# Patient Record
Sex: MALE | Race: WHITE | NOT HISPANIC OR LATINO | Employment: FULL TIME | URBAN - METROPOLITAN AREA
[De-identification: names, ages, dates, MRNs, and addresses within clinical notes are randomized per-mention and may not be internally consistent; named-entity substitution may affect disease eponyms.]

---

## 2019-06-17 ENCOUNTER — APPOINTMENT (OUTPATIENT)
Dept: RADIOLOGY | Facility: CLINIC | Age: 49
End: 2019-06-17

## 2019-06-17 ENCOUNTER — OFFICE VISIT (OUTPATIENT)
Dept: URGENT CARE | Facility: CLINIC | Age: 49
End: 2019-06-17

## 2019-06-17 VITALS
WEIGHT: 207 LBS | OXYGEN SATURATION: 100 % | SYSTOLIC BLOOD PRESSURE: 118 MMHG | HEIGHT: 68 IN | HEART RATE: 56 BPM | RESPIRATION RATE: 16 BRPM | TEMPERATURE: 97.6 F | DIASTOLIC BLOOD PRESSURE: 70 MMHG | BODY MASS INDEX: 31.37 KG/M2

## 2019-06-17 DIAGNOSIS — S62.647A CLOSED NONDISPLACED FRACTURE OF PROXIMAL PHALANX OF LEFT LITTLE FINGER, INITIAL ENCOUNTER: Primary | ICD-10-CM

## 2019-06-17 DIAGNOSIS — T14.90XA INJURY: ICD-10-CM

## 2019-06-17 PROCEDURE — 73140 X-RAY EXAM OF FINGER(S): CPT

## 2019-06-17 PROCEDURE — 99204 OFFICE O/P NEW MOD 45 MIN: CPT | Performed by: PHYSICIAN ASSISTANT

## 2019-06-28 ENCOUNTER — OFFICE VISIT (OUTPATIENT)
Dept: OBGYN CLINIC | Facility: CLINIC | Age: 49
End: 2019-06-28
Payer: COMMERCIAL

## 2019-06-28 ENCOUNTER — APPOINTMENT (OUTPATIENT)
Dept: RADIOLOGY | Facility: CLINIC | Age: 49
End: 2019-06-28
Payer: COMMERCIAL

## 2019-06-28 VITALS
SYSTOLIC BLOOD PRESSURE: 146 MMHG | WEIGHT: 207 LBS | HEART RATE: 69 BPM | BODY MASS INDEX: 31.37 KG/M2 | DIASTOLIC BLOOD PRESSURE: 93 MMHG | HEIGHT: 68 IN

## 2019-06-28 DIAGNOSIS — S62.647A CLOSED NONDISPLACED FRACTURE OF PROXIMAL PHALANX OF LEFT LITTLE FINGER, INITIAL ENCOUNTER: Primary | ICD-10-CM

## 2019-06-28 DIAGNOSIS — S62.647A CLOSED NONDISPLACED FRACTURE OF PROXIMAL PHALANX OF LEFT LITTLE FINGER, INITIAL ENCOUNTER: ICD-10-CM

## 2019-06-28 PROCEDURE — 73140 X-RAY EXAM OF FINGER(S): CPT

## 2019-06-28 PROCEDURE — 99203 OFFICE O/P NEW LOW 30 MIN: CPT | Performed by: ORTHOPAEDIC SURGERY

## 2019-06-28 PROCEDURE — 26720 TREAT FINGER FRACTURE EACH: CPT | Performed by: ORTHOPAEDIC SURGERY

## 2019-07-25 ENCOUNTER — OFFICE VISIT (OUTPATIENT)
Dept: OBGYN CLINIC | Facility: CLINIC | Age: 49
End: 2019-07-25

## 2019-07-25 ENCOUNTER — APPOINTMENT (OUTPATIENT)
Dept: RADIOLOGY | Facility: CLINIC | Age: 49
End: 2019-07-25
Payer: COMMERCIAL

## 2019-07-25 VITALS
WEIGHT: 206 LBS | DIASTOLIC BLOOD PRESSURE: 97 MMHG | HEIGHT: 68 IN | HEART RATE: 72 BPM | SYSTOLIC BLOOD PRESSURE: 153 MMHG | BODY MASS INDEX: 31.22 KG/M2

## 2019-07-25 DIAGNOSIS — S62.647A CLOSED NONDISPLACED FRACTURE OF PROXIMAL PHALANX OF LEFT LITTLE FINGER, INITIAL ENCOUNTER: Primary | ICD-10-CM

## 2019-07-25 DIAGNOSIS — S62.647A CLOSED NONDISPLACED FRACTURE OF PROXIMAL PHALANX OF LEFT LITTLE FINGER, INITIAL ENCOUNTER: ICD-10-CM

## 2019-07-25 PROCEDURE — 73140 X-RAY EXAM OF FINGER(S): CPT

## 2019-07-25 PROCEDURE — 99024 POSTOP FOLLOW-UP VISIT: CPT | Performed by: ORTHOPAEDIC SURGERY

## 2019-07-25 NOTE — PROGRESS NOTES
Assessment/Plan:  1  Closed nondisplaced fracture of proximal phalanx of left little finger, initial encounter  XR finger left fifth digit-adry Queen has no pain along his proximal fifth digit but does have delayed healing on his x-ray with a persistent fracture line 6 weeks after his injury  I advised continued gentle range of motion and gradual return to activity as tolerated  We could involve formal physical therapy however he does not seem to have much discomfort  We could repeat his x-ray in 1 month for further evaluation  Subjective:   Estela Yu is a 50 y o  male who presents for follow-up for a oblique nondisplaced fracture in the proximal phalanx of the fifth digit in the left hand  He denies any pain today but does report some increased swelling in the finger  He denies any new injury  He has been playing basketball with jeremias taping  Review of Systems   Constitutional: Negative for chills, fever and unexpected weight change  HENT: Negative for hearing loss, nosebleeds and sore throat  Eyes: Negative for pain, redness and visual disturbance  Respiratory: Negative for cough, shortness of breath and wheezing  Cardiovascular: Negative for chest pain, palpitations and leg swelling  Gastrointestinal: Negative for abdominal pain, nausea and vomiting  Endocrine: Negative for polyphagia and polyuria  Genitourinary: Negative for dysuria and hematuria  Musculoskeletal:        See HPI   Skin: Negative for rash and wound  Neurological: Negative for dizziness, numbness and headaches  Psychiatric/Behavioral: Negative for decreased concentration and suicidal ideas  The patient is not nervous/anxious            Past Medical History:   Diagnosis Date    Patient denies medical problems        Past Surgical History:   Procedure Laterality Date    ACHILLES TENDON REPAIR      FINGER SURGERY      right hand figth digit    KNEE SURGERY         Family History   Problem Relation Age of Onset    Fibromyalgia Mother     Aneurysm Mother     Hypertension Father     Hyperlipidemia Father        Social History     Occupational History    Not on file   Tobacco Use    Smoking status: Never Smoker    Smokeless tobacco: Never Used   Substance and Sexual Activity    Alcohol use: Yes     Frequency: 2-4 times a month     Drinks per session: 1 or 2     Binge frequency: Less than monthly    Drug use: Never    Sexual activity: Not on file       No current outpatient medications on file  Allergies   Allergen Reactions    Tincture Of Benzoin [Benzoin]        Objective:  Vitals:    07/25/19 1406   BP: 153/97   Pulse: 72       Left Hand Exam     Tenderness   The patient is experiencing no tenderness  Range of Motion   Hand   MP Little: normal   PIP Little: 80   DIP Little: 80     Other   Erythema: absent  Sensation: normal  Pulse: present            Physical Exam   Constitutional: He is oriented to person, place, and time  He appears well-developed and well-nourished  HENT:   Head: Normocephalic and atraumatic  Eyes: Pupils are equal, round, and reactive to light  Conjunctivae are normal    Neck: Normal range of motion  Neck supple  Cardiovascular: Normal rate and intact distal pulses  Pulmonary/Chest: Effort normal  No respiratory distress  Musculoskeletal:   As noted in HPI   Neurological: He is alert and oriented to person, place, and time  Skin: Skin is warm and dry  Psychiatric: He has a normal mood and affect  His behavior is normal    Vitals reviewed  I have personally reviewed pertinent films in PACS and my interpretation is as follows: Three view x-rays of the left proximal fifth digit demonstrates stable healing oblique proximal phalanx fracture with stable alignment  Visible fracture line remains evident

## 2023-07-07 ENCOUNTER — APPOINTMENT (OUTPATIENT)
Dept: LAB | Facility: CLINIC | Age: 53
End: 2023-07-07
Payer: COMMERCIAL

## 2023-07-07 ENCOUNTER — OFFICE VISIT (OUTPATIENT)
Dept: INTERNAL MEDICINE CLINIC | Facility: CLINIC | Age: 53
End: 2023-07-07
Payer: COMMERCIAL

## 2023-07-07 VITALS
HEART RATE: 74 BPM | TEMPERATURE: 97.5 F | BODY MASS INDEX: 34.1 KG/M2 | SYSTOLIC BLOOD PRESSURE: 150 MMHG | DIASTOLIC BLOOD PRESSURE: 90 MMHG | HEIGHT: 68 IN | OXYGEN SATURATION: 96 % | WEIGHT: 225 LBS

## 2023-07-07 DIAGNOSIS — Z11.3 SCREENING FOR STD (SEXUALLY TRANSMITTED DISEASE): ICD-10-CM

## 2023-07-07 DIAGNOSIS — R03.0 ELEVATED BLOOD PRESSURE READING WITHOUT DIAGNOSIS OF HYPERTENSION: ICD-10-CM

## 2023-07-07 DIAGNOSIS — Z00.00 ANNUAL PHYSICAL EXAM: Primary | ICD-10-CM

## 2023-07-07 DIAGNOSIS — N52.9 ERECTILE DYSFUNCTION, UNSPECIFIED ERECTILE DYSFUNCTION TYPE: ICD-10-CM

## 2023-07-07 DIAGNOSIS — Z00.00 ANNUAL PHYSICAL EXAM: ICD-10-CM

## 2023-07-07 LAB
ALBUMIN SERPL BCP-MCNC: 4.4 G/DL (ref 3.5–5)
ALP SERPL-CCNC: 91 U/L (ref 34–104)
ALT SERPL W P-5'-P-CCNC: 25 U/L (ref 7–52)
ANION GAP SERPL CALCULATED.3IONS-SCNC: 7 MMOL/L
AST SERPL W P-5'-P-CCNC: 17 U/L (ref 13–39)
BASOPHILS # BLD AUTO: 0.03 THOUSANDS/ÂΜL (ref 0–0.1)
BASOPHILS NFR BLD AUTO: 1 % (ref 0–1)
BILIRUB SERPL-MCNC: 0.91 MG/DL (ref 0.2–1)
BUN SERPL-MCNC: 18 MG/DL (ref 5–25)
CALCIUM SERPL-MCNC: 9.2 MG/DL (ref 8.4–10.2)
CHLORIDE SERPL-SCNC: 105 MMOL/L (ref 96–108)
CHOLEST SERPL-MCNC: 260 MG/DL
CO2 SERPL-SCNC: 26 MMOL/L (ref 21–32)
CREAT SERPL-MCNC: 0.74 MG/DL (ref 0.6–1.3)
EOSINOPHIL # BLD AUTO: 0.07 THOUSAND/ÂΜL (ref 0–0.61)
EOSINOPHIL NFR BLD AUTO: 2 % (ref 0–6)
ERYTHROCYTE [DISTWIDTH] IN BLOOD BY AUTOMATED COUNT: 13 % (ref 11.6–15.1)
GFR SERPL CREATININE-BSD FRML MDRD: 106 ML/MIN/1.73SQ M
GLUCOSE P FAST SERPL-MCNC: 90 MG/DL (ref 65–99)
HCT VFR BLD AUTO: 45.7 % (ref 36.5–49.3)
HCV AB SER QL: NORMAL
HDLC SERPL-MCNC: 77 MG/DL
HGB BLD-MCNC: 15 G/DL (ref 12–17)
HIV 1+2 AB+HIV1 P24 AG SERPL QL IA: NORMAL
HIV 2 AB SERPL QL IA: NORMAL
HIV1 AB SERPL QL IA: NORMAL
HIV1 P24 AG SERPL QL IA: NORMAL
IMM GRANULOCYTES # BLD AUTO: 0.01 THOUSAND/UL (ref 0–0.2)
IMM GRANULOCYTES NFR BLD AUTO: 0 % (ref 0–2)
LDLC SERPL CALC-MCNC: 157 MG/DL (ref 0–100)
LYMPHOCYTES # BLD AUTO: 1.34 THOUSANDS/ÂΜL (ref 0.6–4.47)
LYMPHOCYTES NFR BLD AUTO: 29 % (ref 14–44)
MCH RBC QN AUTO: 30.1 PG (ref 26.8–34.3)
MCHC RBC AUTO-ENTMCNC: 32.8 G/DL (ref 31.4–37.4)
MCV RBC AUTO: 92 FL (ref 82–98)
MONOCYTES # BLD AUTO: 0.46 THOUSAND/ÂΜL (ref 0.17–1.22)
MONOCYTES NFR BLD AUTO: 10 % (ref 4–12)
NEUTROPHILS # BLD AUTO: 2.67 THOUSANDS/ÂΜL (ref 1.85–7.62)
NEUTS SEG NFR BLD AUTO: 58 % (ref 43–75)
NRBC BLD AUTO-RTO: 0 /100 WBCS
PLATELET # BLD AUTO: 304 THOUSANDS/UL (ref 149–390)
PMV BLD AUTO: 8.7 FL (ref 8.9–12.7)
POTASSIUM SERPL-SCNC: 3.9 MMOL/L (ref 3.5–5.3)
PROT SERPL-MCNC: 6.9 G/DL (ref 6.4–8.4)
PSA SERPL-MCNC: 0.88 NG/ML (ref 0–4)
RBC # BLD AUTO: 4.99 MILLION/UL (ref 3.88–5.62)
SODIUM SERPL-SCNC: 138 MMOL/L (ref 135–147)
TREPONEMA PALLIDUM IGG+IGM AB [PRESENCE] IN SERUM OR PLASMA BY IMMUNOASSAY: NORMAL
TRIGL SERPL-MCNC: 132 MG/DL
TSH SERPL DL<=0.05 MIU/L-ACNC: 1.38 UIU/ML (ref 0.45–4.5)
WBC # BLD AUTO: 4.58 THOUSAND/UL (ref 4.31–10.16)

## 2023-07-07 PROCEDURE — 87491 CHLMYD TRACH DNA AMP PROBE: CPT

## 2023-07-07 PROCEDURE — 86803 HEPATITIS C AB TEST: CPT

## 2023-07-07 PROCEDURE — 86780 TREPONEMA PALLIDUM: CPT

## 2023-07-07 PROCEDURE — 87389 HIV-1 AG W/HIV-1&-2 AB AG IA: CPT

## 2023-07-07 PROCEDURE — 80061 LIPID PANEL: CPT

## 2023-07-07 PROCEDURE — 80053 COMPREHEN METABOLIC PANEL: CPT

## 2023-07-07 PROCEDURE — G0103 PSA SCREENING: HCPCS

## 2023-07-07 PROCEDURE — 87591 N.GONORRHOEAE DNA AMP PROB: CPT

## 2023-07-07 PROCEDURE — 99386 PREV VISIT NEW AGE 40-64: CPT | Performed by: NURSE PRACTITIONER

## 2023-07-07 PROCEDURE — 86695 HERPES SIMPLEX TYPE 1 TEST: CPT

## 2023-07-07 PROCEDURE — 86696 HERPES SIMPLEX TYPE 2 TEST: CPT

## 2023-07-07 PROCEDURE — 84443 ASSAY THYROID STIM HORMONE: CPT

## 2023-07-07 PROCEDURE — 36415 COLL VENOUS BLD VENIPUNCTURE: CPT

## 2023-07-07 PROCEDURE — 85025 COMPLETE CBC W/AUTO DIFF WBC: CPT

## 2023-07-07 NOTE — PROGRESS NOTES
ADULT ANNUAL PHYSICAL  [facility] INTERNAL MEDICINE    NAME: Safia Lacey  AGE: 46 y.o. SEX: male  : 1970     DATE: 2023     Assessment and Plan:     Problem List Items Addressed This Visit        Other    Elevated blood pressure reading without diagnosis of hypertension     Recommend home blood pressure monitoring. Follow a low sodium diet. Check labs. RTO in 2 weeks. Erectile dysfunction     Discussed starting medication. Will check labs and monitor blood pressure. Readdress at next appointment in 2 weeks. Other Visit Diagnoses     Annual physical exam    -  Primary    Relevant Orders    Comprehensive metabolic panel    CBC and differential (Completed)    Lipid Panel with Direct LDL reflex    TSH, 3rd generation with Free T4 reflex    PSA, Total Screen    Screening for STD (sexually transmitted disease)        Relevant Orders    : HIV 1/2 AB/AG w Reflex [facility] for 2 yr old and above    Hepatitis C antibody    RPR-Syphilis Screening (Total Syphilis IGG/IGM)    Herpes I/II IgG PHUONG w Reflex to HSV-2    Chlamydia/GC amplified DNA by PCR          Immunizations and preventive care screenings were discussed with patient today. Appropriate education was printed on patient's after visit summary. BMI Counseling: Body mass index is 34.21 kg/m². The BMI is above normal. Nutrition recommendations include decreasing portion sizes. Exercise recommendations include exercising 3-5 times per week. Rationale for BMI follow-up plan is due to patient being overweight or obese. Depression Screening and Follow-up Plan: Patient was screened for depression during today's encounter. They screened negative with a PHQ-2 score of 0. Return in about 2 weeks (around 2023) for Next scheduled follow up.      Chief Complaint:     Chief Complaint   Patient presents with   • Establish Care      History of Present Illness:     Adult Annual Physical Patient here for a comprehensive physical exam. The patient reports see below. Radhika Lee is here today to establish care  Last saw PCP years ago     Had a colonoscopy 2 years ago- 2 polyps, good for 5 years. He does report some issues with erectile dysfunction recently. Diet and Physical Activity  · Diet/Nutrition: poor diet. · Exercise: 3-4 times a week on average. Depression Screening  PHQ-2/9 Depression Screening    Little interest or pleasure in doing things: 0 - not at all  Feeling down, depressed, or hopeless: 0 - not at all  PHQ-2 Score: 0  PHQ-2 Interpretation: Negative depression screen       General Health  · Sleep: sleeps poorly. · Hearing: normal - none . · Vision: goes for regular eye exams and wears glasses. · Dental: regular dental visits. Review of Systems:     Review of Systems   Constitutional: Negative for activity change, appetite change, fatigue and unexpected weight change. Eyes: Negative for visual disturbance. Respiratory: Negative for cough and shortness of breath. Cardiovascular: Negative for chest pain, palpitations and leg swelling. Gastrointestinal: Negative for abdominal pain, blood in stool, constipation and diarrhea. Genitourinary: Negative for difficulty urinating. Musculoskeletal: Negative for arthralgias. Skin: Negative for rash. Neurological: Negative for dizziness, light-headedness and headaches. Psychiatric/Behavioral: Negative for sleep disturbance.       Past Medical History:     Past Medical History:   Diagnosis Date   • Allergic    • Obesity    • Patient denies medical problems    • Pneumonia       Past Surgical History:     Past Surgical History:   Procedure Laterality Date   • ACHILLES TENDON REPAIR     • FINGER SURGERY      right hand figth digit   • FRACTURE SURGERY     • HEEL SPUR SURGERY     • KNEE SURGERY        Family History:     Family History   Problem Relation Age of Onset   • Fibromyalgia Mother    • Aneurysm Mother    • Heart disease Mother    • Hypertension Father    • Hyperlipidemia Father    • Anxiety disorder Sister    • Completed Suicide  Sister       Social History:     Social History     Socioeconomic History   • Marital status: Unknown     Spouse name: None   • Number of children: None   • Years of education: None   • Highest education level: None   Occupational History   • None   Tobacco Use   • Smoking status: Never   • Smokeless tobacco: Never   Vaping Use   • Vaping Use: Never used   Substance and Sexual Activity   • Alcohol use: Yes     Alcohol/week: 9.0 standard drinks of alcohol     Types: 3 Shots of liquor, 6 Standard drinks or equivalent per week   • Drug use: Never   • Sexual activity: Yes     Partners: Female     Birth control/protection: Condom Male, Rhythm   Other Topics Concern   • None   Social History Narrative   • None     Social Determinants of Health     Financial Resource Strain: Not on file   Food Insecurity: Not on file   Transportation Needs: Not on file   Physical Activity: Not on file   Stress: Not on file   Social Connections: Not on file   Intimate Partner Violence: Not on file   Housing Stability: Not on file      Current Medications:     No current outpatient medications on file. No current facility-administered medications for this visit. Allergies:     No Known Allergies   Physical Exam:     /90   Pulse 74   Temp 97.5 °F (36.4 °C)   Ht 5' 8" (1.727 m)   Wt 102 kg (225 lb)   SpO2 96%   BMI 34.21 kg/m²     Physical Exam  Vitals reviewed. Constitutional:       Appearance: Normal appearance. HENT:      Head: Normocephalic and atraumatic. Right Ear: Tympanic membrane, ear canal and external ear normal.      Left Ear: Tympanic membrane, ear canal and external ear normal.   Eyes:      Conjunctiva/sclera: Conjunctivae normal.   Cardiovascular:      Rate and Rhythm: Normal rate and regular rhythm. Heart sounds: Normal heart sounds.    Pulmonary: Effort: Pulmonary effort is normal.      Breath sounds: Normal breath sounds. Abdominal:      General: Bowel sounds are normal.      Palpations: Abdomen is soft. Musculoskeletal:         General: Normal range of motion. Cervical back: Neck supple. Right lower leg: No edema. Left lower leg: No edema. Skin:     General: Skin is warm and dry. Neurological:      Mental Status: He is alert and oriented to person, place, and time.    Psychiatric:         Mood and Affect: Mood normal.         Behavior: Behavior normal.          Daron Christopher, [address] INTERNAL MEDIC[facility]

## 2023-07-07 NOTE — ASSESSMENT & PLAN NOTE
Discussed starting medication. Will check labs and monitor blood pressure. Readdress at next appointment in 2 weeks.

## 2023-07-08 LAB
HSV1 IGG SER IA-ACNC: 13.9 INDEX (ref 0–0.9)
HSV2 IGG SER IA-ACNC: <0.91 INDEX (ref 0–0.9)

## 2023-07-10 LAB
C TRACH DNA SPEC QL NAA+PROBE: NEGATIVE
N GONORRHOEA DNA SPEC QL NAA+PROBE: NEGATIVE

## 2023-07-11 DIAGNOSIS — Z30.09 VASECTOMY EVALUATION: Primary | ICD-10-CM

## 2023-07-25 ENCOUNTER — OFFICE VISIT (OUTPATIENT)
Dept: INTERNAL MEDICINE CLINIC | Facility: CLINIC | Age: 53
End: 2023-07-25
Payer: COMMERCIAL

## 2023-07-25 VITALS
OXYGEN SATURATION: 96 % | HEART RATE: 86 BPM | BODY MASS INDEX: 35.31 KG/M2 | HEIGHT: 68 IN | DIASTOLIC BLOOD PRESSURE: 78 MMHG | WEIGHT: 233 LBS | SYSTOLIC BLOOD PRESSURE: 138 MMHG | TEMPERATURE: 97 F

## 2023-07-25 DIAGNOSIS — N52.9 ERECTILE DYSFUNCTION, UNSPECIFIED ERECTILE DYSFUNCTION TYPE: ICD-10-CM

## 2023-07-25 DIAGNOSIS — Z30.09 VASECTOMY EVALUATION: ICD-10-CM

## 2023-07-25 DIAGNOSIS — R03.0 ELEVATED BLOOD PRESSURE READING WITHOUT DIAGNOSIS OF HYPERTENSION: Primary | ICD-10-CM

## 2023-07-25 DIAGNOSIS — E78.2 MIXED HYPERLIPIDEMIA: ICD-10-CM

## 2023-07-25 PROBLEM — E78.5 HYPERLIPIDEMIA: Status: ACTIVE | Noted: 2023-07-25

## 2023-07-25 PROCEDURE — 99214 OFFICE O/P EST MOD 30 MIN: CPT | Performed by: NURSE PRACTITIONER

## 2023-07-25 RX ORDER — SILDENAFIL 50 MG/1
50 TABLET, FILM COATED ORAL DAILY PRN
Qty: 10 TABLET | Refills: 0 | Status: SHIPPED | OUTPATIENT
Start: 2023-07-25

## 2023-07-25 NOTE — ASSESSMENT & PLAN NOTE
Blood pressure reading better in office today  Advised home blood pressure monitoring and call if >140/85

## 2023-07-25 NOTE — ASSESSMENT & PLAN NOTE
ASCVD risk score 5%. Discussed diet modifications, weight loss, and regular exercise program.  Will recheck lipids in 4 months. Consider statin if not improving.

## 2023-07-25 NOTE — PROGRESS NOTES
Name: Rachelle Hood      : 1970      MRN: [id number]  Encounter Provider: LIAM Isaacs  Encounter Date: 2023   Encounter department: [address] [facility]     1. Elevated blood pressure reading without diagnosis of hypertension  Assessment & Plan:  Blood pressure reading better in office today  Advised home blood pressure monitoring and call if >140/85      2. Erectile dysfunction, unspecified erectile dysfunction type  Assessment & Plan: Will start viagra as needed. Orders:  -     sildenafil (VIAGRA) 50 MG tablet; Take 1 tablet (50 mg total) by mouth daily as needed for erectile dysfunction    3. Mixed hyperlipidemia  Assessment & Plan:  ASCVD risk score 5%. Discussed diet modifications, weight loss, and regular exercise program.  Will recheck lipids in 4 months. Consider statin if not improving. Orders:  -     Comprehensive metabolic panel; Future  -     Lipid Panel with Direct LDL reflex; Future    4. Vasectomy evaluation  -     Ambulatory Referral to Urology; Future         Subjective      Valarie Fus is here today for follow up  He was seen in the office 2 weeks ago to establish care  His blood pressure was elevated during the visit. He has not checked it at home. He reports intermittent left hand numbness, ongoing for months. Its worse when he's riding a motorcycle or holding his hand a certain way. This weekend he drove a boat which has made it worse. He does have some neck pain but its on the right. He is requesting viagra for occasional ED. Review of Systems   Constitutional: Negative for activity change and appetite change. Respiratory: Negative for shortness of breath. Cardiovascular: Negative for chest pain. Gastrointestinal: Negative for abdominal pain. Musculoskeletal: Positive for arthralgias and neck pain. Neurological: Positive for numbness. Negative for dizziness, light-headedness and headaches. No current outpatient medications on file prior to visit. Objective     /78   Pulse 86   Temp (!) 97 °F (36.1 °C)   Ht 5' 8" (1.727 m)   Wt 106 kg (233 lb)   SpO2 96%   BMI 35.43 kg/m²     Physical Exam  Vitals reviewed. Constitutional:       Appearance: Normal appearance. HENT:      Head: Normocephalic and atraumatic. Eyes:      Conjunctiva/sclera: Conjunctivae normal.   Cardiovascular:      Rate and Rhythm: Normal rate and regular rhythm. Heart sounds: Normal heart sounds. Pulmonary:      Effort: Pulmonary effort is normal.      Breath sounds: Normal breath sounds. Neurological:      Mental Status: He is alert and oriented to person, place, and time.    Psychiatric:         Mood and Affect: Mood normal.         Behavior: Behavior normal.       LIAM Weldon

## 2023-08-23 ENCOUNTER — OFFICE VISIT (OUTPATIENT)
Dept: UROLOGY | Facility: AMBULATORY SURGERY CENTER | Age: 53
End: 2023-08-23
Payer: COMMERCIAL

## 2023-08-23 VITALS
DIASTOLIC BLOOD PRESSURE: 90 MMHG | SYSTOLIC BLOOD PRESSURE: 130 MMHG | HEART RATE: 89 BPM | OXYGEN SATURATION: 100 % | WEIGHT: 232 LBS | BODY MASS INDEX: 35.16 KG/M2 | HEIGHT: 68 IN

## 2023-08-23 DIAGNOSIS — Z30.09 VASECTOMY EVALUATION: ICD-10-CM

## 2023-08-23 DIAGNOSIS — Z12.11 ENCOUNTER FOR SCREENING COLONOSCOPY: Primary | ICD-10-CM

## 2023-08-23 PROCEDURE — 99203 OFFICE O/P NEW LOW 30 MIN: CPT | Performed by: PHYSICIAN ASSISTANT

## 2023-08-23 RX ORDER — DIAZEPAM 5 MG/1
TABLET ORAL
Qty: 1 TABLET | Refills: 0 | Status: SHIPPED | OUTPATIENT
Start: 2023-08-23

## 2023-08-23 NOTE — PROGRESS NOTES
8/23/2023      Chief Complaint   Patient presents with   • Sterilization     Consult       Assessment and Plan    48 y.o. male managed by new patient    1. Desire for elective sterilization  - exam today as below  - informed consent signed today  - continue/ensure continued contraception  I.e. condom, OCP, IUD until sterilization confirmed below  - rx valium 5mg with  to/from on appt date  - shave all penile/scrotal/pubic hair day prior to appt date  - need for post-vasectomy semen analysis at 8 weeks after procedure to confirm sterility    Return for vasectomy in office. Pt requests Dr Sera Narayanan. History of Present Illness  Rosy Ochoa is a 48 y.o. male here for evaluation of VASECTOMY CONSULT    History of genitourinary or groin trauma or surgery- no  Fathered children- two adult children  Personal and/or mutual desire for permanent sterility- yes. 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 We discussed that vasectomy is in operation performed in the office in order to provide elective sterilization. There are instances in which body habitus or changes to the genital tissue/anatomy would necessitate procedure done in a surgical suite/hospital operating room. Physical exam is performed today to assess the candidate specifically for this reason. This procedure should be considered a permanent option. Although there are subspecialists who perform vasectomy reversals, these operations are not 100% successful and are often not covered by insurance meaning they can come with a large out-of-pocket cost. The patient understands this. We reviewed the procedure in depth.  Risk and benefits of the procedure were discussed and reviewed. Informed consent was obtained in the office today. The patient was prescribed a benzodiazepine to take one hour prior to the procedure to assist with his comfort. He understands that he will require transportation by a sober  to and from the office that day if he is to use the benzodiazepine. He also understands he will require semen analysis testing at 8 weeks post procedure to ensure full sterilization. In the interim, he will require contraception during intercourse to avoid an undesired pregnancy. Usually, patients are out of work for 2-3 days. We recommend tight fitting scrotal support following the procedure along with ice packs applied to the scrotum 15 minutes on and 15 minutes off for the first 24-48 hours. We discussed that we do send the patient home with short course of anti-inflammatory and/or narcotic pain medication. After this discussion, the patient agrees to proceed. We will schedule him in the near future. He agrees to take oral sedative - valium 5mg one hour prior to procedure. Review of Systems   Constitutional: Negative. Respiratory: Negative. Cardiovascular: Negative. Genitourinary: Negative for decreased urine volume, difficulty urinating, dysuria, flank pain, frequency, hematuria, scrotal swelling, testicular pain and urgency. Musculoskeletal: Negative. Vitals  Vitals:    08/23/23 0957   BP: 130/90   BP Location: Left arm   Patient Position: Sitting   Cuff Size: Large   Pulse: 89   SpO2: 100%   Weight: 105 kg (232 lb)   Height: 5' 8" (1.727 m)       Physical Exam    General: Well appearing, no distress, appears stated age. HEENT:  Normocephalic, atraumatic. Conjunctiva clear. Respiratory: Nonlabored respirations, no wheeze or cough  Abdomen:  Soft nontender without hernia. No suprapubic or CVA tenderness.   Genitourinary: Circumcised penis, normal phallus, orthotopic patent meatus. Testes smooth descended bilaterally into the scrotum nontender with no palpable mass. Palpably normal spermatic cord and vas deferens bilaterally. Musculoskeletal:  Normal range of motion and gait without defecit. Neuro: No gross neurologic defect or abnormality. Steady unassisted gait. Speech and affect normal.  Dermatologic: skin warm, dry; no rash erythema or ecchymosis      Past History  Past Medical History:   Diagnosis Date   • Allergic    • Obesity    • Patient denies medical problems    • Pneumonia      Social History     Socioeconomic History   • Marital status: Unknown     Spouse name: None   • Number of children: None   • Years of education: None   • Highest education level: None   Occupational History   • None   Tobacco Use   • Smoking status: Never   • Smokeless tobacco: Never   Vaping Use   • Vaping Use: Never used   Substance and Sexual Activity   • Alcohol use:  Yes     Alcohol/week: 9.0 standard drinks of alcohol     Types: 3 Shots of liquor, 6 Standard drinks or equivalent per week   • Drug use: Never   • Sexual activity: Yes     Partners: Female     Birth control/protection: Condom Male, Rhythm   Other Topics Concern   • None   Social History Narrative   • None     Social Determinants of Health     Financial Resource Strain: Not on file   Food Insecurity: Not on file   Transportation Needs: Not on file   Physical Activity: Not on file   Stress: Not on file   Social Connections: Not on file   Intimate Partner Violence: Not on file   Housing Stability: Not on file     Social History     Tobacco Use   Smoking Status Never   Smokeless Tobacco Never     Family History   Problem Relation Age of Onset   • Fibromyalgia Mother    • Aneurysm Mother    • Heart disease Mother    • Hypertension Father    • Hyperlipidemia Father    • Anxiety disorder Sister    • Completed Suicide  Sister        The following portions of the patient's history were reviewed and updated as appropriate: allergies, current medications, past medical history, past social history, past surgical history and problem list.    Results  No results found for this or any previous visit (from the past 1 hour(s)).]  Lab Results   Component Value Date    PSA 0.88 07/07/2023     Lab Results   Component Value Date    CALCIUM 9.2 07/07/2023    K 3.9 07/07/2023    CO2 26 07/07/2023     07/07/2023    BUN 18 07/07/2023    CREATININE 0.74 07/07/2023     Lab Results   Component Value Date    WBC 4.58 07/07/2023    HGB 15.0 07/07/2023    HCT 45.7 07/07/2023    MCV 92 07/07/2023     07/07/2023

## 2023-10-17 ENCOUNTER — TELEPHONE (OUTPATIENT)
Dept: UROLOGY | Facility: AMBULATORY SURGERY CENTER | Age: 53
End: 2023-10-17

## 2023-10-17 NOTE — TELEPHONE ENCOUNTER
Pt was called today to reschedule VAS and he was hoping for a sooner appointment with Jenny Quezada. The next available that he is scheduled for is Jan 9th, so he would like us to watch for a cancellation for earlier availability.

## 2023-10-25 NOTE — TELEPHONE ENCOUNTER
Tried to call and offer another time for his vas, however, his phone was unable to take any messages.

## 2023-10-26 NOTE — TELEPHONE ENCOUNTER
Called and offered a different day and location, however, he wanted to stay with his original day 1/9/24

## 2023-11-20 ENCOUNTER — TELEPHONE (OUTPATIENT)
Dept: INTERNAL MEDICINE CLINIC | Facility: CLINIC | Age: 53
End: 2023-11-20

## 2023-11-20 NOTE — TELEPHONE ENCOUNTER
Pt is requesting to have his testosterone and estrogen levels checked. He's been feeling sluggish and has some extra breast tissue. He'd also like to have STD testing added. Pt would like to get this done in the morning so you will have the results for his appointment later in the afternoon.

## 2023-11-21 ENCOUNTER — OFFICE VISIT (OUTPATIENT)
Dept: INTERNAL MEDICINE CLINIC | Facility: CLINIC | Age: 53
End: 2023-11-21
Payer: COMMERCIAL

## 2023-11-21 VITALS
TEMPERATURE: 98.3 F | SYSTOLIC BLOOD PRESSURE: 136 MMHG | HEIGHT: 68 IN | DIASTOLIC BLOOD PRESSURE: 84 MMHG | OXYGEN SATURATION: 98 % | BODY MASS INDEX: 35.28 KG/M2 | HEART RATE: 80 BPM

## 2023-11-21 DIAGNOSIS — N52.9 ERECTILE DYSFUNCTION, UNSPECIFIED ERECTILE DYSFUNCTION TYPE: ICD-10-CM

## 2023-11-21 DIAGNOSIS — I10 ESSENTIAL HYPERTENSION: Primary | ICD-10-CM

## 2023-11-21 DIAGNOSIS — R20.0 NUMBNESS IN BOTH HANDS: ICD-10-CM

## 2023-11-21 DIAGNOSIS — E78.2 MIXED HYPERLIPIDEMIA: ICD-10-CM

## 2023-11-21 DIAGNOSIS — Z11.3 SCREENING FOR STD (SEXUALLY TRANSMITTED DISEASE): ICD-10-CM

## 2023-11-21 DIAGNOSIS — N62 GYNECOMASTIA, MALE: ICD-10-CM

## 2023-11-21 PROCEDURE — 99214 OFFICE O/P EST MOD 30 MIN: CPT | Performed by: NURSE PRACTITIONER

## 2023-11-21 RX ORDER — AMLODIPINE BESYLATE 5 MG/1
5 TABLET ORAL DAILY
Qty: 30 TABLET | Refills: 0 | Status: SHIPPED | OUTPATIENT
Start: 2023-11-21

## 2023-11-21 RX ORDER — SILDENAFIL 50 MG/1
50 TABLET, FILM COATED ORAL DAILY PRN
Qty: 10 TABLET | Refills: 4 | Status: SHIPPED | OUTPATIENT
Start: 2023-11-21

## 2023-11-21 NOTE — ASSESSMENT & PLAN NOTE
Start amlodipine 5 mg daily. Recommend home blood pressure monitoring and a low sodium diet. RTO in 2 weeks for BP check.

## 2023-11-21 NOTE — TELEPHONE ENCOUNTER
Even if I ordered the labs now, they may not be resulted by today. Continue with appointment so I can order the necessary labs and evaluate his symptoms.

## 2023-11-21 NOTE — PROGRESS NOTES
Name: Karine Zaragoza      : 1970      MRN: [id number]  Encounter Provider: LIAM Solis  Encounter Date: 2023   Encounter department: [id number] [facility]     1. Essential hypertension  Assessment & Plan:  Start amlodipine 5 mg daily. Recommend home blood pressure monitoring and a low sodium diet. RTO in 2 weeks for BP check. Orders:  -     amLODIPine (NORVASC) 5 mg tablet; Take 1 tablet (5 mg total) by mouth daily    2. Erectile dysfunction, unspecified erectile dysfunction type  Assessment & Plan:  Continue viagra as needed    Orders:  -     sildenafil (VIAGRA) 50 MG tablet; Take 1 tablet (50 mg total) by mouth daily as needed for erectile dysfunction  -     Testosterone, free, total; Future    3. Mixed hyperlipidemia  Assessment & Plan:  Recheck lipids, consider statin if no improvement. 4. Screening for STD (sexually transmitted disease)  -     Chlamydia/GC amplified DNA by PCR; Future  -     RPR-Syphilis Screening (Total Syphilis IGG/IGM); Future  -     Hepatitis C antibody; Future  -     HIV 1/2 AB/AG w Reflex [facility] for 2 yr old and above; Future  -     Herpes I/II IgG PHUONG w Reflex to HSV-2; Future    5. Numbness in both hands  -     Ambulatory Referral to Orthopedic Surgery; Future    6. Gynecomastia, male  -     Ambulatory Referral to Endocrinology; Future           Subjective      Jarred Black is here today for follow up  He is doing well. He is not checking his blood pressure at home. He continues to have numbness in his fingers, despite bracing at bedtime for the last 2 months. He is concerned about his weight distrubution. He has most of his weight up in his chest area. He is requesting to get hormone levels checked. Review of Systems   Constitutional:  Negative for activity change and appetite change. Respiratory:  Negative for shortness of breath.     Cardiovascular:  Negative for chest pain and palpitations. Gastrointestinal:  Negative for abdominal pain. Neurological:  Positive for numbness. Negative for dizziness, light-headedness and headaches. Current Outpatient Medications on File Prior to Visit   Medication Sig    diazepam (VALIUM) 5 mg tablet Take one tablet one hour prior to vasectomy. Need  to/from appointment    [DISCONTINUED] sildenafil (VIAGRA) 50 MG tablet Take 1 tablet (50 mg total) by mouth daily as needed for erectile dysfunction       Objective     /84   Pulse 80   Temp 98.3 °F (36.8 °C)   Ht 5' 8" (1.727 m)   SpO2 98%   BMI 35.28 kg/m²     Physical Exam  Vitals reviewed. Constitutional:       Appearance: Normal appearance. HENT:      Head: Normocephalic and atraumatic. Eyes:      Conjunctiva/sclera: Conjunctivae normal.   Cardiovascular:      Rate and Rhythm: Normal rate and regular rhythm. Heart sounds: Normal heart sounds. Pulmonary:      Effort: Pulmonary effort is normal.      Breath sounds: Normal breath sounds. Neurological:      Mental Status: He is alert and oriented to person, place, and time.    Psychiatric:         Mood and Affect: Mood normal.         Behavior: Behavior normal.       LIAM Ann

## 2023-11-27 ENCOUNTER — TELEPHONE (OUTPATIENT)
Dept: OBGYN CLINIC | Facility: CLINIC | Age: 53
End: 2023-11-27

## 2023-11-27 NOTE — TELEPHONE ENCOUNTER
Called pt to r/s their appt on 11/29 with Mindy Barajas. Pt stated they preferred [facility]. Appt has been r/s to 12/4.

## 2023-12-04 ENCOUNTER — OFFICE VISIT (OUTPATIENT)
Dept: OBGYN CLINIC | Facility: CLINIC | Age: 53
End: 2023-12-04
Payer: COMMERCIAL

## 2023-12-04 VITALS
DIASTOLIC BLOOD PRESSURE: 97 MMHG | SYSTOLIC BLOOD PRESSURE: 144 MMHG | OXYGEN SATURATION: 98 % | BODY MASS INDEX: 35.74 KG/M2 | HEART RATE: 101 BPM | WEIGHT: 235.8 LBS | HEIGHT: 68 IN

## 2023-12-04 DIAGNOSIS — R20.0 NUMBNESS IN BOTH HANDS: ICD-10-CM

## 2023-12-04 PROCEDURE — 99203 OFFICE O/P NEW LOW 30 MIN: CPT | Performed by: PHYSICIAN ASSISTANT

## 2023-12-04 NOTE — PROGRESS NOTES
ASSESSMENT/PLAN:    Assessment:   Carpal tunnel syndrome, left > right  Cubital tunnel syndrome, left    Plan:   Brace as needed    Follow Up: With Dr. Yesika Horne     To Do Next Visit:       General Discussions:     Carpal Tunnel Syndrome: The anatomy and physiology of carpal tunnel syndrome was discussed with the patient today. Increase pressure localized under the transverse carpal ligament can cause pain, numbness, tingling, or dysesthesias within the median nerve distribution as well as feelings of fatigue, clumsiness, or awkwardness. These symptoms typically occur at night and worse in the morning upon waking. Eventually, untreated carpal tunnel syndrome can result in weakness and permanent loss of muscle within the thenar compartment of the hand. Treatment options were discussed with the patient. Conservative treatment includes nocturnal resting splints to keep the nerve in a neutral position, ergonomic changes within the work or home environment, activity modification, and tendon gliding exercises. Steroid injections within the carpal canal can help a majority of patients, however this is often self-limited in a majority of patients. Surgical intervention to divide the transverse carpal ligament typically results in a long-lasting relief of the patient's complaints, with the recurrence rate of less than 1%. Cubital Tunnel Syndrome: The anatomy and physiology of cubital tunnel syndrome were discussed with the patient today in the office. Typically, increased elbow flexion activities decrease blood flow within the intraneural spaces, resulting in a feeling of numbness, tingling, weakness, or clumsiness within the hand and fingers. Occasionally, anatomic structures such as medial elbow osteophytes, the medial head of the triceps, were subluxing ulnar nerve may result in increased pressure or aggravation at the cubital tunnel.   Typical signs and symptoms usually include numbness and tingling within the ring and small finger, weakness with , and weakness with pinch. Conservative treatment and includes nocturnal bracing to keep the elbow in a semi-extended position, activity modification, therapy, and avoiding excessive elbow flexion activities. A majority of patients typically respond to conservative treatment over a period of approximately 3-6 months. EMG/NCV testing of the ulnar nerve at the elbow is not as reliable as carpal tunnel syndrome. Surgical intervention in the form of in situ release of the ulnar nerve at the elbow or ulnar nerve transposition may be required in up to 20% of patients.         _____________________________________________________  CHIEF COMPLAINT:  Chief Complaint   Patient presents with    Left Hand - Numbness, Tingling     Ongping  L>R    Right Hand - Numbness, Tingling     On and off         SUBJECTIVE:  Karine Zaragoza is a 48 y.o. male who presents with Numbness of the bilateral index finger, long finger, and ring finger. To a lesser extent, it affects the tips of the thumb and small fingers. This started  1 year(s) ago: Insidious. Previous Treatments: bracing   Associated symptoms: None  Handedness: right  Work status: does a lot of typing. No heavy lifting.     PAST MEDICAL HISTORY:  Past Medical History:   Diagnosis Date    Allergic     Obesity     Patient denies medical problems     Pneumonia        PAST SURGICAL HISTORY:  Past Surgical History:   Procedure Laterality Date    ACHILLES TENDON REPAIR      FINGER SURGERY      right hand figth digit    FRACTURE SURGERY      HEEL SPUR SURGERY      KNEE SURGERY         FAMILY HISTORY:  Family History   Problem Relation Age of Onset    Fibromyalgia Mother     Aneurysm Mother     Heart disease Mother     Hypertension Father     Hyperlipidemia Father     Anxiety disorder Sister     Completed Suicide  Sister        SOCIAL HISTORY:  Social History     Tobacco Use    Smoking status: Never    Smokeless tobacco: Never   Vaping Use Vaping Use: Never used   Substance Use Topics    Alcohol use: Yes     Alcohol/week: 9.0 standard drinks of alcohol     Types: 3 Shots of liquor, 6 Standard drinks or equivalent per week    Drug use: Never       MEDICATIONS:    Current Outpatient Medications:     amLODIPine (NORVASC) 5 mg tablet, Take 1 tablet (5 mg total) by mouth daily, Disp: 30 tablet, Rfl: 0    diazepam (VALIUM) 5 mg tablet, Take one tablet one hour prior to vasectomy. Need  to/from appointment, Disp: 1 tablet, Rfl: 0    sildenafil (VIAGRA) 50 MG tablet, Take 1 tablet (50 mg total) by mouth daily as needed for erectile dysfunction, Disp: 10 tablet, Rfl: 4    ALLERGIES:  No Known Allergies    REVIEW OF SYSTEMS:  Pertinent items are noted in HPI. A comprehensive review of systems was negative. LABS:  HgA1c: No results found for: "HGBA1C"  BMP:   Lab Results   Component Value Date    CALCIUM 9.2 07/07/2023    K 3.9 07/07/2023    CO2 26 07/07/2023     07/07/2023    BUN 18 07/07/2023    CREATININE 0.74 07/07/2023         _____________________________________________________  PHYSICAL EXAMINATION:  Vital signs: /97   Pulse 101   Ht 5' 8" (1.727 m)   Wt 107 kg (235 lb 12.8 oz)   SpO2 98%   BMI 35.85 kg/m²   General: well developed and well nourished, alert, oriented times 3, and appears comfortable  Psychiatric: Normal  HEENT: Trachea Midline, No torticollis  Cardiovascular: Regular rate and rhythm  Pulmonary: No audible wheezing   Abdomen: No guarding  Extremities: No lymphedema  Skin: No masses, erythema, lacerations, fluctation, ulcerations  Neurovascular:  Motor Intact to the Median, Ulnar, Radial Nerve and Pulses Intact    MUSCULOSKELETAL EXAMINATION:  LEFT SIDE:  Carpal tunnel:  No weakness APB, No weakness AIN, No thenar atrophy, + Tinels, and + Phalens and Cubital Tunnel:  + Tinels  RIGHT SIDE:  Carpal tunnel:  No weakness APB, No weakness AIN, No thenar atrophy, + Tinels, and + Phalens and Cubital Tunnel:  No tinels, weakness, or ulnar clawing        _____________________________________________________  STUDIES REVIEWED:  No Studies to review      PROCEDURES PERFORMED:  Procedures  No Procedures performed today

## 2023-12-05 ENCOUNTER — CLINICAL SUPPORT (OUTPATIENT)
Dept: INTERNAL MEDICINE CLINIC | Facility: CLINIC | Age: 53
End: 2023-12-05

## 2023-12-05 VITALS — DIASTOLIC BLOOD PRESSURE: 90 MMHG | SYSTOLIC BLOOD PRESSURE: 138 MMHG

## 2023-12-05 DIAGNOSIS — I10 ESSENTIAL HYPERTENSION: Primary | ICD-10-CM

## 2023-12-06 ENCOUNTER — APPOINTMENT (OUTPATIENT)
Dept: LAB | Facility: CLINIC | Age: 53
End: 2023-12-06
Payer: COMMERCIAL

## 2023-12-06 ENCOUNTER — OFFICE VISIT (OUTPATIENT)
Dept: OBGYN CLINIC | Facility: CLINIC | Age: 53
End: 2023-12-06
Payer: COMMERCIAL

## 2023-12-06 VITALS
WEIGHT: 235 LBS | BODY MASS INDEX: 35.61 KG/M2 | HEIGHT: 68 IN | SYSTOLIC BLOOD PRESSURE: 150 MMHG | HEART RATE: 90 BPM | DIASTOLIC BLOOD PRESSURE: 95 MMHG

## 2023-12-06 DIAGNOSIS — G56.03 CARPAL TUNNEL SYNDROME, BILATERAL: Primary | ICD-10-CM

## 2023-12-06 DIAGNOSIS — Z11.3 SCREENING FOR STD (SEXUALLY TRANSMITTED DISEASE): ICD-10-CM

## 2023-12-06 DIAGNOSIS — N52.9 ERECTILE DYSFUNCTION, UNSPECIFIED ERECTILE DYSFUNCTION TYPE: ICD-10-CM

## 2023-12-06 DIAGNOSIS — G56.23 CUBITAL TUNNEL SYNDROME, BILATERAL: ICD-10-CM

## 2023-12-06 DIAGNOSIS — E78.2 MIXED HYPERLIPIDEMIA: ICD-10-CM

## 2023-12-06 LAB
ALBUMIN SERPL BCP-MCNC: 4.5 G/DL (ref 3.5–5)
ALP SERPL-CCNC: 85 U/L (ref 34–104)
ALT SERPL W P-5'-P-CCNC: 27 U/L (ref 7–52)
ANION GAP SERPL CALCULATED.3IONS-SCNC: 6 MMOL/L
AST SERPL W P-5'-P-CCNC: 22 U/L (ref 13–39)
BILIRUB SERPL-MCNC: 1.03 MG/DL (ref 0.2–1)
BUN SERPL-MCNC: 27 MG/DL (ref 5–25)
CALCIUM SERPL-MCNC: 9.4 MG/DL (ref 8.4–10.2)
CHLORIDE SERPL-SCNC: 106 MMOL/L (ref 96–108)
CHOLEST SERPL-MCNC: 262 MG/DL
CO2 SERPL-SCNC: 29 MMOL/L (ref 21–32)
CREAT SERPL-MCNC: 0.83 MG/DL (ref 0.6–1.3)
GFR SERPL CREATININE-BSD FRML MDRD: 100 ML/MIN/1.73SQ M
GLUCOSE P FAST SERPL-MCNC: 96 MG/DL (ref 65–99)
HCV AB SER QL: NORMAL
HDLC SERPL-MCNC: 85 MG/DL
HIV 1+2 AB+HIV1 P24 AG SERPL QL IA: NORMAL
HIV 2 AB SERPL QL IA: NORMAL
HIV1 AB SERPL QL IA: NORMAL
HIV1 P24 AG SERPL QL IA: NORMAL
LDLC SERPL CALC-MCNC: 155 MG/DL (ref 0–100)
POTASSIUM SERPL-SCNC: 4.4 MMOL/L (ref 3.5–5.3)
PROT SERPL-MCNC: 7.4 G/DL (ref 6.4–8.4)
SODIUM SERPL-SCNC: 141 MMOL/L (ref 135–147)
TREPONEMA PALLIDUM IGG+IGM AB [PRESENCE] IN SERUM OR PLASMA BY IMMUNOASSAY: NORMAL
TRIGL SERPL-MCNC: 112 MG/DL

## 2023-12-06 PROCEDURE — 99214 OFFICE O/P EST MOD 30 MIN: CPT | Performed by: ORTHOPAEDIC SURGERY

## 2023-12-06 PROCEDURE — 80061 LIPID PANEL: CPT

## 2023-12-06 PROCEDURE — 86803 HEPATITIS C AB TEST: CPT

## 2023-12-06 PROCEDURE — 87591 N.GONORRHOEAE DNA AMP PROB: CPT

## 2023-12-06 PROCEDURE — 86695 HERPES SIMPLEX TYPE 1 TEST: CPT

## 2023-12-06 PROCEDURE — 87491 CHLMYD TRACH DNA AMP PROBE: CPT

## 2023-12-06 PROCEDURE — 86696 HERPES SIMPLEX TYPE 2 TEST: CPT

## 2023-12-06 PROCEDURE — 36415 COLL VENOUS BLD VENIPUNCTURE: CPT

## 2023-12-06 PROCEDURE — 87389 HIV-1 AG W/HIV-1&-2 AB AG IA: CPT

## 2023-12-06 PROCEDURE — 80053 COMPREHEN METABOLIC PANEL: CPT

## 2023-12-06 PROCEDURE — 86780 TREPONEMA PALLIDUM: CPT

## 2023-12-06 NOTE — PROGRESS NOTES
[address]  [address] [place] [id number]  [phone]       Re Pace  [phone]  1970    ORTHOPAEDIC SURGERY OUTPATIENT NOTE  12/6/2023      HISTORY:  48 y.o. male presents for initial evaluation of bilateral hand numbness. He reports constant numbness in the fingers of the left hand with intermittent numbness in the tips of the fingers on the right side. He has some pain over the bilateral medial elbows. He works on a computer and notes numbness when he is riding a bike or work on the computer for a long period of time. He has tried over-the-counter braces. He has not had any injections. He is right-hand dominant. Past Medical History:   Diagnosis Date    Allergic     Obesity     Patient denies medical problems     Pneumonia        Past Surgical History:   Procedure Laterality Date    ACHILLES TENDON REPAIR      FINGER SURGERY      right hand figth digit    FRACTURE SURGERY      HEEL SPUR SURGERY      KNEE SURGERY         Social History     Socioeconomic History    Marital status:      Spouse name: Not on file    Number of children: Not on file    Years of education: Not on file    Highest education level: Not on file   Occupational History    Not on file   Tobacco Use    Smoking status: Never    Smokeless tobacco: Never   Vaping Use    Vaping Use: Never used   Substance and Sexual Activity    Alcohol use:  Yes     Alcohol/week: 9.0 standard drinks of alcohol     Types: 3 Shots of liquor, 6 Standard drinks or equivalent per week    Drug use: Never    Sexual activity: Yes     Partners: Female     Birth control/protection: Condom Male, Rhythm   Other Topics Concern    Not on file   Social History Narrative    Not on file     Social Determinants of Health     Financial Resource Strain: Not on file   Food Insecurity: Not on file   Transportation Needs: Not on file   Physical Activity: Not on file   Stress: Not on file   Social Connections: Not on file   Intimate Partner Violence: Not on file   Housing Stability: Not on file       Family History   Problem Relation Age of Onset    Fibromyalgia Mother     Aneurysm Mother     Heart disease Mother     Hypertension Father     Hyperlipidemia Father     Anxiety disorder Sister     Completed Suicide  Sister         Patient's Medications   New Prescriptions    No medications on file   Previous Medications    AMLODIPINE (NORVASC) 5 MG TABLET    Take 1 tablet (5 mg total) by mouth daily    DIAZEPAM (VALIUM) 5 MG TABLET    Take one tablet one hour prior to vasectomy. Need  to/from appointment    SILDENAFIL (VIAGRA) 50 MG TABLET    Take 1 tablet (50 mg total) by mouth daily as needed for erectile dysfunction   Modified Medications    No medications on file   Discontinued Medications    No medications on file       No Known Allergies     /95 (BP Location: Left arm, Patient Position: Sitting, Cuff Size: Standard)   Pulse 90   Ht 5' 8" (1.727 m)   Wt 107 kg (235 lb)   BMI 35.73 kg/m²      REVIEW OF SYSTEMS:  Constitutional: Negative. HEENT: Negative. Respiratory: Negative. Skin: Negative. Neurological: Negative. Psychiatric/Behavioral: Negative. Musculoskeletal: Negative except for that mentioned in the HPI.     /95 (BP Location: Left arm, Patient Position: Sitting, Cuff Size: Standard)   Pulse 90   Ht 5' 8" (1.727 m)   Wt 107 kg (235 lb)   BMI 35.73 kg/m²   Gen: No acute distress, resting comfortably in bed  HEENT: Eyes clear, moist mucus membranes, hearing intact  Respiratory: No audible wheezing or stridor  Cardiovascular: Well Perfused peripherally, 2+ distal pulse  Abdomen: nondistended, no peritoneal signs    PHYSICAL EXAM:     Right hand:    Durkan's test: Positive  Tinel's test: Positive  Phalen's test: Negative  Median Nerve: Normal  Ulnar Nerve: Normal  Thenar Muscle atrophy: Negative  Hypothenar Muscle atrophy: Negative  Tinel ulnar nerve: positive    Left Hand:     Durkan's test: Positive  Tinel's test: Positive  Phalen's test: Negative  Median Nerve: Normal  Ulnar Nerve: Normal  Thenar Muscle atrophy: Negative  Hypothenar Muscle atrophy: Negative  Tinel ulnar nerve: negative    IMAGING:  no imaging    ASSESSMENT AND PLAN:  48 y.o. male with bilateral hand numbness likely consistent with bilateral carpal and cubital tunnel syndromes. Discussed with the patient we will order an ultrasound of bilateral elbows and bilateral wrist to evaluate for carpal and cubital tunnel syndromes. Will see him back after the ultrasounds. May discuss possible carpal tunnel versus cubital tunnel releases at that next visit.     Scribe Attestation      I,:  Elana Byrnes PA-C am acting as a scribe while in the presence of the attending physician.:       I,:  Pacheco Estrella personally performed the services described in this documentation    as scribed in my presence.:

## 2023-12-07 LAB
C TRACH DNA SPEC QL NAA+PROBE: NEGATIVE
HSV1 IGG SER IA-ACNC: 10.9 INDEX (ref 0–0.9)
HSV2 IGG SER IA-ACNC: <0.91 INDEX (ref 0–0.9)
N GONORRHOEA DNA SPEC QL NAA+PROBE: NEGATIVE

## 2023-12-15 DIAGNOSIS — E78.2 MIXED HYPERLIPIDEMIA: Primary | ICD-10-CM

## 2023-12-15 RX ORDER — ATORVASTATIN CALCIUM 20 MG/1
20 TABLET, FILM COATED ORAL DAILY
Qty: 90 TABLET | Refills: 0 | Status: SHIPPED | OUTPATIENT
Start: 2023-12-15

## 2023-12-21 ENCOUNTER — HOSPITAL ENCOUNTER (OUTPATIENT)
Dept: ULTRASOUND IMAGING | Facility: HOSPITAL | Age: 53
Discharge: HOME | End: 2023-12-21
Payer: COMMERCIAL

## 2023-12-21 DIAGNOSIS — G56.23 CUBITAL TUNNEL SYNDROME, BILATERAL: ICD-10-CM

## 2023-12-21 DIAGNOSIS — G56.03 CARPAL TUNNEL SYNDROME, BILATERAL: ICD-10-CM

## 2023-12-21 PROCEDURE — 76882 US LMTD JT/FCL EVL NVASC XTR: CPT

## 2023-12-24 DIAGNOSIS — I10 ESSENTIAL HYPERTENSION: ICD-10-CM

## 2023-12-26 RX ORDER — AMLODIPINE BESYLATE 5 MG/1
5 TABLET ORAL DAILY
Qty: 90 TABLET | Refills: 1 | Status: SHIPPED | OUTPATIENT
Start: 2023-12-26

## 2024-01-09 ENCOUNTER — PROCEDURE VISIT (OUTPATIENT)
Dept: UROLOGY | Facility: AMBULATORY SURGERY CENTER | Age: 54
End: 2024-01-09
Payer: COMMERCIAL

## 2024-01-09 VITALS
HEART RATE: 78 BPM | DIASTOLIC BLOOD PRESSURE: 80 MMHG | OXYGEN SATURATION: 98 % | SYSTOLIC BLOOD PRESSURE: 132 MMHG | HEIGHT: 68 IN | BODY MASS INDEX: 35.61 KG/M2 | RESPIRATION RATE: 18 BRPM | WEIGHT: 235 LBS

## 2024-01-09 DIAGNOSIS — Z30.2 ENCOUNTER FOR STERILIZATION: Primary | ICD-10-CM

## 2024-01-09 DIAGNOSIS — Z30.09 VASECTOMY EVALUATION: ICD-10-CM

## 2024-01-09 PROCEDURE — 88302 TISSUE EXAM BY PATHOLOGIST: CPT | Performed by: PATHOLOGY

## 2024-01-09 PROCEDURE — 55250 REMOVAL OF SPERM DUCT(S): CPT | Performed by: UROLOGY

## 2024-01-09 NOTE — PROGRESS NOTES
Vasectomy     Date/Time  1/9/2024 9:30 AM     Performed by  Isaac Lobo MD   Authorized by  Isaac Lobo MD              Wilder is a 53-year-old male who request elective sterilization with vasectomy.      Vasectomy Procedure Note:  Risks and benefits of vasectomy were previously discussed. Informed consent was obtained at his prior office visit. The patient had taken Valium as prescribed.  The patient was placed in the supine position. His genitalia was prepped and draped in a sterile fashion. The left vas deferens was grasped and presented to the midline of the scrotum. 2% lidocaine was used to anesthetize the skin, subcutaneous tissue, and left vas deferens. A scalpeless opening was made in the midline of the scrotum. The left vas deferens was grasped and presented into the surgical field. A #15 blade was used to make a longitudinal incision in the sheath of the vas deferens. The vas itself was then dissected free from the surrounding tissue. Clamps were placed proximally and distally and a 1 cm segment of the vas deferens was excised. Silk ties were applied and exposed ends were fulgurated. Hemostasis was excellent. The vas was returned to its natural anatomic position and the same procedure was then repeated on the patient's right side. A single stitch was placed through the skin and dartos to reapproximate the defect. Bacitracin ointment was applied.      The patient is status post vasectomy. I recommended rest, ice, and scrotal support. I've asked that he return in the next 2-3 weeks for a post vasectomy check. Tylenol/Advil as needed for pain. The patient understands that he is not yet considered sterile. I recommend a single post vasectomy semen analysis at 8 weeks. In the interim I have recommended contraception to avoid an undesired pregnancy.

## 2024-01-11 PROCEDURE — 88302 TISSUE EXAM BY PATHOLOGIST: CPT | Performed by: PATHOLOGY

## 2024-02-15 ENCOUNTER — APPOINTMENT (OUTPATIENT)
Dept: LAB | Facility: HOSPITAL | Age: 54
End: 2024-02-15
Payer: COMMERCIAL

## 2024-02-15 ENCOUNTER — CONSULT (OUTPATIENT)
Dept: ENDOCRINOLOGY | Facility: CLINIC | Age: 54
End: 2024-02-15
Payer: COMMERCIAL

## 2024-02-15 VITALS
HEART RATE: 86 BPM | HEIGHT: 68 IN | OXYGEN SATURATION: 99 % | BODY MASS INDEX: 34.56 KG/M2 | WEIGHT: 228 LBS | RESPIRATION RATE: 14 BRPM | TEMPERATURE: 97 F | SYSTOLIC BLOOD PRESSURE: 152 MMHG | DIASTOLIC BLOOD PRESSURE: 88 MMHG

## 2024-02-15 DIAGNOSIS — N52.9 ERECTILE DYSFUNCTION, UNSPECIFIED ERECTILE DYSFUNCTION TYPE: ICD-10-CM

## 2024-02-15 DIAGNOSIS — E66.9 CLASS 1 OBESITY WITH BODY MASS INDEX (BMI) OF 34.0 TO 34.9 IN ADULT, UNSPECIFIED OBESITY TYPE, UNSPECIFIED WHETHER SERIOUS COMORBIDITY PRESENT: ICD-10-CM

## 2024-02-15 DIAGNOSIS — N62 GYNECOMASTIA, MALE: Primary | ICD-10-CM

## 2024-02-15 DIAGNOSIS — E78.2 MIXED HYPERLIPIDEMIA: ICD-10-CM

## 2024-02-15 PROBLEM — E66.811 CLASS 1 OBESITY WITH BODY MASS INDEX (BMI) OF 34.0 TO 34.9 IN ADULT: Status: ACTIVE | Noted: 2024-02-15

## 2024-02-15 PROBLEM — E29.1 HYPOGONADISM IN MALE: Status: RESOLVED | Noted: 2024-02-15 | Resolved: 2024-02-15

## 2024-02-15 PROBLEM — E29.1 HYPOGONADISM IN MALE: Status: ACTIVE | Noted: 2024-02-15

## 2024-02-15 PROCEDURE — 84403 ASSAY OF TOTAL TESTOSTERONE: CPT

## 2024-02-15 PROCEDURE — 99204 OFFICE O/P NEW MOD 45 MIN: CPT | Performed by: INTERNAL MEDICINE

## 2024-02-15 PROCEDURE — 84402 ASSAY OF FREE TESTOSTERONE: CPT

## 2024-02-15 NOTE — ASSESSMENT & PLAN NOTE
Today we discussed all aspects of hypogonadism including normal physiology, pathophysiology, contributing systemic conditions, common symptoms, management principles, complications of treatment and goals of therapy.    We agreed to check his HPG axis and other metabolic profile.  Advised medical fitness training.  Follow up in 4-6 weeks with repeat labs.

## 2024-02-15 NOTE — PROGRESS NOTES
"  New Consult Note      CC: gynecomastia, weight gain    History of Present Illness:   53 yr male with HLD, obesity BMI 34, gynecomastia and weakness.    Max adult weight 275 lbs age 40; minimum adult weight 190 lbs age 22yrs.    He reports occasional ED but normal libido. Sexually active and no testicular injury in past.    Physical Exam:  Body mass index is 34.67 kg/m².  /88 (BP Location: Left arm, Patient Position: Sitting)   Pulse 86   Temp (!) 97 °F (36.1 °C) (Tympanic)   Resp 14   Ht 5' 8\" (1.727 m)   Wt 103 kg (228 lb)   SpO2 99%   BMI 34.67 kg/m²    Vitals:    02/15/24 1128   Weight: 103 kg (228 lb)        Physical Exam  Constitutional:       General: He is not in acute distress.     Appearance: He is well-developed. He is not ill-appearing.   HENT:      Head: Normocephalic and atraumatic.      Nose: Nose normal.      Mouth/Throat:      Pharynx: Oropharynx is clear.   Eyes:      Extraocular Movements: Extraocular movements intact.      Conjunctiva/sclera: Conjunctivae normal.   Neck:      Thyroid: No thyromegaly.   Cardiovascular:      Rate and Rhythm: Normal rate.   Pulmonary:      Effort: Pulmonary effort is normal.   Abdominal:      Comments: Pannus+   Genitourinary:     Comments: Mild increased fat deposition around areola. Unclear wither there is underlying breast tissue on exam.  Musculoskeletal:         General: No deformity.      Cervical back: Normal range of motion.   Skin:     Capillary Refill: Capillary refill takes less than 2 seconds.      Coloration: Skin is not pale.      Findings: No rash.   Neurological:      Mental Status: He is alert and oriented to person, place, and time.   Psychiatric:         Behavior: Behavior normal.         Labs:   No results found for: \"HGBA1C\"    Lab Results   Component Value Date    LRV4VAWRNGVT 1.378 07/07/2023       Lab Results   Component Value Date    CREATININE 0.83 12/06/2023    CREATININE 0.74 07/07/2023    BUN 27 (H) 12/06/2023    K 4.4 " "12/06/2023     12/06/2023    CO2 29 12/06/2023     eGFR   Date Value Ref Range Status   12/06/2023 100 ml/min/1.73sq m Final       Lab Results   Component Value Date    ALT 27 12/06/2023    AST 22 12/06/2023    ALKPHOS 85 12/06/2023       Lab Results   Component Value Date    CHOLESTEROL 262 (H) 12/06/2023    CHOLESTEROL 260 (H) 07/07/2023     Lab Results   Component Value Date    HDL 85 12/06/2023    HDL 77 07/07/2023     Lab Results   Component Value Date    TRIG 112 12/06/2023    TRIG 132 07/07/2023     No results found for: \"NONHDLC\"      Assessment/Plan:    Problem List Items Addressed This Visit          Other    Erectile dysfunction    Hyperlipidemia     ASCVD risk is 4.9%. he is on statin and last LDL was 155mg/dL.         Gynecomastia, male - Primary     Today we discussed all aspects of hypogonadism including normal physiology, pathophysiology, contributing systemic conditions, common symptoms, management principles, complications of treatment and goals of therapy.    We agreed to check his HPG axis and other metabolic profile.  Advised medical fitness training.  Follow up in 4-6 weeks with repeat labs.           Relevant Orders    Estradiol    Sex Hormone Binding Globulin    Follicle stimulating hormone    Luteinizing hormone    Prolactin    Comprehensive metabolic panel    Vitamin D 25 hydroxy    Class 1 obesity with body mass index (BMI) of 34.0 to 34.9 in adult     He has pannus and increase skin remnant from prior weight loss. Unclear wether there is significant gynecomastia on exam.    Today we discussed all aspects of obesity and metabolism including pathophysiology, risk factors, complications, goal of sustaining weight loss long term, usual propensity to regain weight with short term approaches, follow up needs and medications - efficacy and limitations.   Discussed role of endocrinopathies, inflammatory conditions, sleep disorders, mental health disorders, lifestyle issues and polypharmacy " and weight gain.  Briefly discussed bariatric surgery.  Diet: carb controlled diet <1500cal/day/ VLCD, 64oz fluids/day   lifestyle modifications: intermittent fasting and 10,000 steps/day  medical fitness training: muscle building  education: nutrition input           Relevant Orders    Insulin, fasting    Comprehensive metabolic panel    Vitamin D 25 hydroxy    Ambulatory Referral to Medical Fitness Exercise Specialist         I have spent a total time of 40 minutes on 02/15/24 in caring for this patient including greater than 50% of this time was spent in counseling/coordination of care as listed above.       Discussed with the patient and all questioned fully answered. He will contact me with concerns.    Lisa Pino

## 2024-02-15 NOTE — ASSESSMENT & PLAN NOTE
He has pannus and increase skin remnant from prior weight loss. Unclear wether there is significant gynecomastia on exam.    Today we discussed all aspects of obesity and metabolism including pathophysiology, risk factors, complications, goal of sustaining weight loss long term, usual propensity to regain weight with short term approaches, follow up needs and medications - efficacy and limitations.   Discussed role of endocrinopathies, inflammatory conditions, sleep disorders, mental health disorders, lifestyle issues and polypharmacy and weight gain.  Briefly discussed bariatric surgery.  Diet: carb controlled diet <1500cal/day/ VLCD, 64oz fluids/day   lifestyle modifications: intermittent fasting and 10,000 steps/day  medical fitness training: muscle building  education: nutrition input

## 2024-02-16 LAB
TESTOST FREE SERPL-MCNC: 6.3 PG/ML (ref 7.2–24)
TESTOST SERPL-MCNC: 440 NG/DL (ref 264–916)

## 2024-03-16 DIAGNOSIS — E78.2 MIXED HYPERLIPIDEMIA: ICD-10-CM

## 2024-03-16 RX ORDER — ATORVASTATIN CALCIUM 20 MG/1
20 TABLET, FILM COATED ORAL DAILY
Qty: 90 TABLET | Refills: 0 | Status: SHIPPED | OUTPATIENT
Start: 2024-03-16

## 2024-04-08 ENCOUNTER — TELEPHONE (OUTPATIENT)
Age: 54
End: 2024-04-08

## 2024-04-08 ENCOUNTER — APPOINTMENT (OUTPATIENT)
Dept: LAB | Facility: CLINIC | Age: 54
End: 2024-04-08
Payer: COMMERCIAL

## 2024-04-08 DIAGNOSIS — Z30.09 VASECTOMY EVALUATION: ICD-10-CM

## 2024-04-08 DIAGNOSIS — Z30.09 VASECTOMY EVALUATION: Primary | ICD-10-CM

## 2024-04-08 LAB
DEPRECATED CD4 CELLS/CD8 CELLS BLD: 3 ML
SPERM MOTILE SMN QL MICRO: ABNORMAL

## 2024-04-08 PROCEDURE — 89321 SEMEN ANAL SPERM DETECTION: CPT

## 2024-04-08 NOTE — TELEPHONE ENCOUNTER
Patient was calling in regards to his Post Vasectomy Semen testing results. He would like the provider or a nurse to review the results and give him a call back.     He can see the results in MyChart. He is just unsure what the results mean    Wilder  [phone]

## 2024-04-09 NOTE — TELEPHONE ENCOUNTER
Called Wilder and gave him results - he is unsure if he needs to do specimen again - I told him I would check with Dr Lobo and get back with him

## 2024-04-09 NOTE — TELEPHONE ENCOUNTER
"semen analysis reviewed (VAS 1/9/24)  \"rare non-motile sperm\" means few nonviable/dead sperm in the sample. while this is technically a sterile result, many men wish to confirm sterility with a second sample after 6-8 more ejaculations (\"to clear out the pipes further\") the next month or so. i have placed a second lab script  "

## 2024-04-10 NOTE — TELEPHONE ENCOUNTER
LM for zana that to be completely sure he is sterile he would need to do another samples. The order is in the system he would just need to  a cup

## 2024-04-11 ENCOUNTER — TELEPHONE (OUTPATIENT)
Dept: UROLOGY | Facility: AMBULATORY SURGERY CENTER | Age: 54
End: 2024-04-11

## 2024-04-11 NOTE — TELEPHONE ENCOUNTER
----- Message from Isaac Lobo MD sent at 4/10/2024  7:27 AM EDT -----  Ok for intercourse without contraception.  Thank you.    FT    ----- Message -----  From: Lab, Background User  Sent: 4/8/2024  10:00 AM EDT  To: Isaac Lobo MD

## 2024-04-30 ENCOUNTER — APPOINTMENT (OUTPATIENT)
Dept: LAB | Facility: CLINIC | Age: 54
End: 2024-04-30
Payer: COMMERCIAL

## 2024-04-30 DIAGNOSIS — E78.2 MIXED HYPERLIPIDEMIA: ICD-10-CM

## 2024-04-30 DIAGNOSIS — E66.9 CLASS 1 OBESITY WITH BODY MASS INDEX (BMI) OF 34.0 TO 34.9 IN ADULT, UNSPECIFIED OBESITY TYPE, UNSPECIFIED WHETHER SERIOUS COMORBIDITY PRESENT: ICD-10-CM

## 2024-04-30 DIAGNOSIS — N62 GYNECOMASTIA, MALE: ICD-10-CM

## 2024-04-30 LAB
25(OH)D3 SERPL-MCNC: 22.7 NG/ML (ref 30–100)
CHOLEST SERPL-MCNC: 256 MG/DL
ESTRADIOL SERPL-MCNC: 32.6 PG/ML
FSH SERPL-ACNC: 8 MIU/ML
HDLC SERPL-MCNC: 76 MG/DL
INSULIN SERPL-ACNC: 6.3 UIU/ML (ref 1.9–23)
LDLC SERPL CALC-MCNC: 162 MG/DL (ref 0–100)
LH SERPL-ACNC: 7 MIU/ML
PROLACTIN SERPL-MCNC: 5.52 NG/ML
TRIGL SERPL-MCNC: 92 MG/DL

## 2024-04-30 PROCEDURE — 84270 ASSAY OF SEX HORMONE GLOBUL: CPT

## 2024-04-30 PROCEDURE — 80061 LIPID PANEL: CPT

## 2024-04-30 PROCEDURE — 82670 ASSAY OF TOTAL ESTRADIOL: CPT

## 2024-04-30 PROCEDURE — 83525 ASSAY OF INSULIN: CPT

## 2024-04-30 PROCEDURE — 36415 COLL VENOUS BLD VENIPUNCTURE: CPT

## 2024-04-30 PROCEDURE — 80053 COMPREHEN METABOLIC PANEL: CPT

## 2024-04-30 PROCEDURE — 82306 VITAMIN D 25 HYDROXY: CPT

## 2024-04-30 PROCEDURE — 84146 ASSAY OF PROLACTIN: CPT

## 2024-04-30 PROCEDURE — 83001 ASSAY OF GONADOTROPIN (FSH): CPT

## 2024-04-30 PROCEDURE — 83002 ASSAY OF GONADOTROPIN (LH): CPT

## 2024-05-01 LAB — SHBG SERPL-SCNC: 31.6 NMOL/L (ref 19.3–76.4)

## 2024-05-02 ENCOUNTER — TELEPHONE (OUTPATIENT)
Age: 54
End: 2024-05-02

## 2024-05-02 ENCOUNTER — OFFICE VISIT (OUTPATIENT)
Dept: ENDOCRINOLOGY | Facility: CLINIC | Age: 54
End: 2024-05-02
Payer: COMMERCIAL

## 2024-05-02 VITALS
DIASTOLIC BLOOD PRESSURE: 72 MMHG | TEMPERATURE: 98.2 F | OXYGEN SATURATION: 98 % | WEIGHT: 238 LBS | HEIGHT: 68 IN | BODY MASS INDEX: 36.07 KG/M2 | SYSTOLIC BLOOD PRESSURE: 136 MMHG | HEART RATE: 88 BPM | RESPIRATION RATE: 16 BRPM

## 2024-05-02 DIAGNOSIS — G47.33 OSA (OBSTRUCTIVE SLEEP APNEA): ICD-10-CM

## 2024-05-02 DIAGNOSIS — E78.2 MIXED HYPERLIPIDEMIA: ICD-10-CM

## 2024-05-02 DIAGNOSIS — N62 GYNECOMASTIA, MALE: Primary | ICD-10-CM

## 2024-05-02 DIAGNOSIS — E66.9 CLASS 1 OBESITY WITH BODY MASS INDEX (BMI) OF 34.0 TO 34.9 IN ADULT: ICD-10-CM

## 2024-05-02 PROCEDURE — 99215 OFFICE O/P EST HI 40 MIN: CPT | Performed by: INTERNAL MEDICINE

## 2024-05-02 NOTE — TELEPHONE ENCOUNTER
PA for wegovy Approved     Date(s) approved  April 2, 2024 to November 28, 2024     Case #     Patient advised by          [x] [employer] Message  [] Phone call   []LMOM  []L/M to call office as no active Communication consent on file  []Unable to leave detailed message as VM not approved on Communication consent       Pharmacy advised by    [x]Fax  []Phone call    Approval letter scanned into Media No

## 2024-05-02 NOTE — PROGRESS NOTES
"    Follow-up Patient Progress Note      CC: gynecomastia, weight gain     History of Present Illness:   53 yr male with HLD, obesity BMI 36, gynecomastia and weakness. Last visit was 2/15/24.     Max adult weight 275 lbs age 40; minimum adult weight 190 lbs age 22yrs.     He reports occasional ED but normal libido. Sexually active and no testicular injury in past.         Physical Exam:  Body mass index is 36.19 kg/m².  /72 (BP Location: Left arm, Patient Position: Sitting)   Pulse 88   Temp 98.2 °F (36.8 °C) (Tympanic)   Resp 16   Ht 5' 8\" (1.727 m)   Wt 108 kg (238 lb)   SpO2 98%   BMI 36.19 kg/m²    Vitals:    05/02/24 1359   Weight: 108 kg (238 lb)        Physical Exam  Constitutional:       General: He is not in acute distress.     Appearance: He is well-developed.   HENT:      Head: Normocephalic and atraumatic.      Nose: Nose normal.   Eyes:      Conjunctiva/sclera: Conjunctivae normal.   Pulmonary:      Effort: Pulmonary effort is normal.   Abdominal:      General: There is no distension.   Musculoskeletal:      Cervical back: Normal range of motion and neck supple.   Skin:     Findings: No rash.      Comments: No icterus   Neurological:      Mental Status: He is alert and oriented to person, place, and time.         Labs:   No results found for: \"HGBA1C\"    Lab Results   Component Value Date    MAL6PDMSNMZS 1.378 07/07/2023       Lab Results   Component Value Date    CREATININE 0.68 04/30/2024    CREATININE 0.83 12/06/2023    CREATININE 0.74 07/07/2023    BUN 24 04/30/2024    K 3.9 04/30/2024     04/30/2024    CO2 28 04/30/2024     eGFR   Date Value Ref Range Status   04/30/2024 109 ml/min/1.73sq m Final       Lab Results   Component Value Date    ALT 22 04/30/2024    AST 15 04/30/2024    ALKPHOS 85 04/30/2024       Lab Results   Component Value Date    CHOLESTEROL 256 (H) 04/30/2024    CHOLESTEROL 262 (H) 12/06/2023    CHOLESTEROL 260 (H) 07/07/2023     Lab Results   Component Value " "Date    HDL 76 04/30/2024    HDL 85 12/06/2023    HDL 77 07/07/2023     Lab Results   Component Value Date    TRIG 92 04/30/2024    TRIG 112 12/06/2023    TRIG 132 07/07/2023     No results found for: \"NONHDLC\"      Assessment/Plan:    1. Gynecomastia, male  Assessment & Plan:  Hormonal profile is normal.  Mildly elevated estradiol and SHBG.    Suggested surgical opinion but may try weight management first.       2. Mixed hyperlipidemia  Assessment & Plan:  Lipitor 20mg qhs. He has high TC, LDL and non HDL.  We may consider increasing statin if diet and lifestyle do not improve non HDL to goal <160mg/dL.      3. Class 1 obesity with body mass index (BMI) of 34.0 to 34.9 in adult  Assessment & Plan:  Today we discussed all aspects of obesity and metabolism including pathophysiology, risk factors, complications, goal of sustaining weight loss long term, usual propensity to regain weight with short term approaches, follow up needs and medications - efficacy and limitations.   Discussed role of endocrinopathies, inflammatory conditions, sleep disorders, mental health disorders, lifestyle issues and polypharmacy and weight gain.  Briefly discussed bariatric surgery.  Diet: carb controlled diet <1500cal/day/ VLCD, 64oz fluids/day   lifestyle modifications: intermittent fasting and 10,000 steps/day  medical fitness training: muscle building  education: nutrition input    Orders:  -     Semaglutide-Weight Management (WEGOVY) 0.25 MG/0.5ML; Inject 0.5 mL (0.25 mg total) under the skin once a week Use 0.25mg weekly for 4 weeks then 0.5mg weekly    4. FATOUMATA (obstructive sleep apnea)  -     Ambulatory referral to Sleep Medicine; Future          I have spent a total time of 42 minutes on 05/02/24 in caring for this patient including greater than 50% of this time was spent in counseling/coordination of care as listed above.       Discussed with the patient and all questioned fully answered. He will contact me with " concerns.    Lisa Pino

## 2024-05-02 NOTE — ASSESSMENT & PLAN NOTE
Hormonal profile is normal.  Mildly elevated estradiol and SHBG.    Suggested surgical opinion but may try weight management first.

## 2024-05-02 NOTE — TELEPHONE ENCOUNTER
PA for wegovy     Submitted via    []CMM-KEY    [x]Vivienne-Case ID # [id number]   []Faxed to plan   []Other website    []Phone call Case ID #      Office notes sent, clinical questions answered. Awaiting determination    Turnaround time for your insurance to make a decision on your Prior Authorization can take 7-21 business days.

## 2024-05-02 NOTE — PATIENT INSTRUCTIONS
Instructions    Diet:   Take 1500 cynthia/day of balanced diet with 1/3rd carbs, 1/3rd protein and rest fiber and small amount fat.   Drink at least 64 oz fluids daily.    Lifestyle:   30 min brisk activity most days. 150min-220min/week of cardiac and muscle building exercise that causes sweating.  Consider [facility] medical fitness training program.  Medical fitness: follow these exercise links  Full Version - [email or web address]     Warmup - [email or web address]     Lower Body - [email or web address]     Upper Body - [email or web address]     Core -  [email or web address]    Fasting:  Can consider after becoming regular with exercise - 14 to 24 hrs fasting 1-3 times a week.    Medications:   look up Wegovy, Zepbound, saxenda - weight loss meds.  Consider switching from medications that cause weight gain to weight neutral medications.    Other:   Make sure you are treated appropriately if you have sleep apnea.  May consider bariatric surgery if we dont see benefit over 6-12 months of all above.

## 2024-05-02 NOTE — ASSESSMENT & PLAN NOTE
Lipitor 20mg qhs. He has high TC, LDL and non HDL.  We may consider increasing statin if diet and lifestyle do not improve non HDL to goal <160mg/dL.

## 2024-05-08 LAB
ALBUMIN SERPL BCP-MCNC: 4.6 G/DL (ref 3.5–5)
ALP SERPL-CCNC: 85 U/L (ref 34–104)
ALT SERPL W P-5'-P-CCNC: 22 U/L (ref 7–52)
ANION GAP SERPL CALCULATED.3IONS-SCNC: 7 MMOL/L (ref 4–13)
AST SERPL W P-5'-P-CCNC: 15 U/L (ref 13–39)
BILIRUB SERPL-MCNC: 0.9 MG/DL (ref 0.2–1)
BUN SERPL-MCNC: 24 MG/DL (ref 5–25)
CALCIUM SERPL-MCNC: 9.4 MG/DL (ref 8.4–10.2)
CHLORIDE SERPL-SCNC: 104 MMOL/L (ref 96–108)
CO2 SERPL-SCNC: 28 MMOL/L (ref 21–32)
CREAT SERPL-MCNC: 0.68 MG/DL (ref 0.6–1.3)
GFR SERPL CREATININE-BSD FRML MDRD: 109 ML/MIN/1.73SQ M
GLUCOSE P FAST SERPL-MCNC: 92 MG/DL (ref 65–99)
POTASSIUM SERPL-SCNC: 3.9 MMOL/L (ref 3.5–5.3)
PROT SERPL-MCNC: 7.5 G/DL (ref 6.4–8.4)
SODIUM SERPL-SCNC: 139 MMOL/L (ref 135–147)

## 2024-05-28 ENCOUNTER — TELEPHONE (OUTPATIENT)
Dept: ENDOCRINOLOGY | Facility: CLINIC | Age: 54
End: 2024-05-28

## 2024-05-28 NOTE — TELEPHONE ENCOUNTER
Pt came into office to request Wegovy refill. Would like it sent to [facility] on [address] [facility]. Took last starting dose on 5/28.     Pt is also requesting that we send a post-dated script for next dose increase if possible.

## 2024-05-30 ENCOUNTER — TELEPHONE (OUTPATIENT)
Age: 54
End: 2024-05-30

## 2024-05-30 NOTE — TELEPHONE ENCOUNTER
Pt is calling back again. He's getting very nervous he is due Tuesday for his wegovy. Please follow up with pt once called in/

## 2024-05-30 NOTE — TELEPHONE ENCOUNTER
Patient needs a refill on his Wegovy, 0 refills on script. He would like to go up to the next dose if possible. He is not having any side effects/symptoms but is not seeing any results and thinks he could benefit from an increased dose.     Pharmacy is [facility] on [facility].    Patient verbalized that he thought this medication was a program and would automatically send refills every 4 weeks of the next dose. I verified that it is not, and he needs approval from his doctor to move up doses, and he has to request the next refill even if it is the same dose.

## 2024-05-30 NOTE — TELEPHONE ENCOUNTER
Patient called for an update about the Wegovy status. States that his next dose is due on Tuesday 6/4/24. Advised that it appears to be waiting for a signature from his provider. Patient is very concerned and would like to talk to someone in the office. Made a warm transfer of the call to Deanna of Dr. Pino's clinical team.

## 2024-05-31 ENCOUNTER — TREATMENT (OUTPATIENT)
Dept: ENDOCRINOLOGY | Facility: CLINIC | Age: 54
End: 2024-05-31

## 2024-05-31 DIAGNOSIS — E66.9 CLASS 1 OBESITY WITH SERIOUS COMORBIDITY AND BODY MASS INDEX (BMI) OF 34.0 TO 34.9 IN ADULT, UNSPECIFIED OBESITY TYPE: Primary | ICD-10-CM

## 2024-05-31 PROCEDURE — NC001 PR NO CHARGE: Performed by: INTERNAL MEDICINE

## 2024-05-31 NOTE — PROGRESS NOTES
Patient walked into office to expedite his wegovy refill.  Earlier today, I had sent for Wegovy 0.5mg and then 7.5mg weekly dose to follow in 4 weeks.    Requested to speak to me in person.  I explained the delay was on my part and Rx was sent.  In case there is a delay in availability, I provided a sample of Ozempic to compensate.    Staff have reported inappropriate behavior from patient. I advised staff to inform him of [facility] policy on patient behavior and advised to direct patient to appropriate channels for patient advocacy as needed.

## 2024-06-03 ENCOUNTER — CONSULT (OUTPATIENT)
Dept: PULMONOLOGY | Facility: CLINIC | Age: 54
End: 2024-06-03
Payer: COMMERCIAL

## 2024-06-03 VITALS
OXYGEN SATURATION: 97 % | SYSTOLIC BLOOD PRESSURE: 122 MMHG | BODY MASS INDEX: 35.92 KG/M2 | HEIGHT: 68 IN | TEMPERATURE: 97.8 F | WEIGHT: 237 LBS | DIASTOLIC BLOOD PRESSURE: 74 MMHG | HEART RATE: 72 BPM

## 2024-06-03 DIAGNOSIS — E66.09 CLASS 2 OBESITY DUE TO EXCESS CALORIES WITHOUT SERIOUS COMORBIDITY WITH BODY MASS INDEX (BMI) OF 36.0 TO 36.9 IN ADULT: ICD-10-CM

## 2024-06-03 DIAGNOSIS — I10 ESSENTIAL HYPERTENSION: ICD-10-CM

## 2024-06-03 DIAGNOSIS — G47.33 OSA (OBSTRUCTIVE SLEEP APNEA): Primary | ICD-10-CM

## 2024-06-03 PROBLEM — E66.812 CLASS 2 OBESITY DUE TO EXCESS CALORIES WITHOUT SERIOUS COMORBIDITY WITH BODY MASS INDEX (BMI) OF 36.0 TO 36.9 IN ADULT: Status: ACTIVE | Noted: 2024-02-15

## 2024-06-03 PROCEDURE — 99204 OFFICE O/P NEW MOD 45 MIN: CPT | Performed by: INTERNAL MEDICINE

## 2024-06-03 NOTE — ASSESSMENT & PLAN NOTE
Mr. Wilder Pacheco has long history of snoring, interrupted sleep and waking up episodes during sleep gasping for air.  He wakes up not rested and feels sleepy and tired during the day.  His girlfriend has noted him having apneic events.  He denied any morning headache.  He had nocturia.  He usually sleeps from 12-8.  He has no problem driving.  He does not consume alcohol regularly.  He has no history of COPD or asthma.  He has history of hypertension and hyperlipidemia.  His [place] sleepiness score was 9 out of 24.  On clinical examination, he was obese and had oropharyngeal crowding.  He likely has significant obstructive sleep apnea and we will get a home sleep study for further evaluation.  I had a long discussion with him and answered all his questions.

## 2024-06-03 NOTE — PROGRESS NOTES
Ambulatory Visit  Name: Wilder Pacheco      : 1970      MRN: [id number]  Encounter Provider: Jonnie Chaap MD  Encounter Date: 6/3/2024   Encounter department: [facility]    Assessment & Plan   1. FATOUMATA (obstructive sleep apnea)  Assessment & Plan:  Mr. Wilder Pacheco has long history of snoring, interrupted sleep and waking up episodes during sleep gasping for air.  He wakes up not rested and feels sleepy and tired during the day.  His girlfriend has noted him having apneic events.  He denied any morning headache.  He had nocturia.  He usually sleeps from 12-8.  He has no problem driving.  He does not consume alcohol regularly.  He has no history of COPD or asthma.  He has history of hypertension and hyperlipidemia.  His [place] sleepiness score was 9 out of 24.  On clinical examination, he was obese and had oropharyngeal crowding.  He likely has significant obstructive sleep apnea and we will get a home sleep study for further evaluation.  I had a long discussion with him and answered all his questions.  Orders:  -     Ambulatory referral to Sleep Medicine  -     Home Study; Future  2. Essential hypertension  Assessment & Plan:  He has history of hypertension and has been on treatment with amlodipine  3. Class 2 obesity due to excess calories without serious comorbidity with body mass index (BMI) of 36.0 to 36.9 in adult  Assessment & Plan:  He is obese and understands the need for weight reduction.  He understands that weight reduction can significantly improve his sleep apnea and other symptoms.      History of Present Illness     Wilder Pacheco is a 53 y.o. male past medical history of hypertension obesity hyperlipidemia who has been referred for evaluation for sleep breathing disorder.  He has a long history of snoring, interrupted sleep and waking up episodes during sleep gasping for air.  He also has witnessed apneas.  He feels unrefreshed in the morning  "and feels sleepy and tired during the day.  He denied any morning headache.  He denied any problems driving.  He has not been sleep tested before.  He has no diabetes.  He is obese and is trying to lose weight.  He has been started on Wegovy.  He has no previous history of heart problems or stroke.  He denied any shortness of breath cough or phlegm or wheeze or chest pain.  No swelling of feet.  No history suggestive of cataplexy or narcolepsy.    Review of Systems   Constitutional:  Positive for fatigue. Negative for appetite change, chills and fever.   HENT:  Positive for rhinorrhea. Negative for hearing loss, sneezing, sore throat and trouble swallowing.    Respiratory:  Negative for cough, chest tightness, shortness of breath and wheezing.    Cardiovascular:  Negative for chest pain, palpitations and leg swelling.   Gastrointestinal:  Negative for abdominal pain, constipation, diarrhea, nausea and vomiting.   Genitourinary:  Positive for frequency. Negative for dysuria and urgency.   Musculoskeletal:  Negative for arthralgias and gait problem.   Skin:  Negative for rash.   Allergic/Immunologic: Positive for environmental allergies.   Neurological:  Negative for dizziness, seizures, syncope, light-headedness and headaches.   Psychiatric/Behavioral:  Positive for sleep disturbance. Negative for agitation and confusion. The patient is not nervous/anxious.        Objective     /74 (BP Location: Left arm, Patient Position: Sitting, Cuff Size: Standard)   Pulse 72   Temp 97.8 °F (36.6 °C) (Tympanic)   Ht 5' 8\" (1.727 m)   Wt 108 kg (237 lb)   SpO2 97%   BMI 36.04 kg/m²     Physical Exam  Vitals reviewed.   HENT:      Head: Normocephalic.      Nose: Nose normal.      Mouth/Throat:      Mouth: Mucous membranes are moist.      Comments: Oropharyngeal crowding  Eyes:      General: No scleral icterus.     Conjunctiva/sclera: Conjunctivae normal.   Cardiovascular:      Rate and Rhythm: Normal rate.      Heart " sounds: Normal heart sounds. No murmur heard.  Pulmonary:      Effort: Pulmonary effort is normal. No respiratory distress.      Breath sounds: Normal breath sounds. No stridor. No rhonchi or rales.   Chest:      Chest wall: No tenderness.   Abdominal:      General: Bowel sounds are normal.      Palpations: Abdomen is soft.      Tenderness: There is no abdominal tenderness. There is no guarding.   Musculoskeletal:      Cervical back: Neck supple. No rigidity.      Right lower leg: No edema.      Left lower leg: No edema.   Lymphadenopathy:      Cervical: No cervical adenopathy.   Skin:     Coloration: Skin is not jaundiced or pale.      Findings: No rash.   Neurological:      Mental Status: He is alert and oriented to person, place, and time.      Gait: Gait normal.   Psychiatric:         Mood and Affect: Mood normal.         Behavior: Behavior normal.         Thought Content: Thought content normal.         Judgment: Judgment normal.       Administrative Statements

## 2024-06-04 NOTE — ASSESSMENT & PLAN NOTE
He is obese and understands the need for weight reduction.  He understands that weight reduction can significantly improve his sleep apnea and other symptoms.

## 2024-06-18 ENCOUNTER — OFFICE VISIT (OUTPATIENT)
Dept: INTERNAL MEDICINE CLINIC | Facility: CLINIC | Age: 54
End: 2024-06-18
Payer: COMMERCIAL

## 2024-06-18 VITALS
DIASTOLIC BLOOD PRESSURE: 80 MMHG | SYSTOLIC BLOOD PRESSURE: 124 MMHG | OXYGEN SATURATION: 98 % | HEIGHT: 68 IN | HEART RATE: 75 BPM | BODY MASS INDEX: 36.07 KG/M2 | TEMPERATURE: 97.6 F | RESPIRATION RATE: 16 BRPM | WEIGHT: 238 LBS

## 2024-06-18 DIAGNOSIS — G47.33 OSA (OBSTRUCTIVE SLEEP APNEA): ICD-10-CM

## 2024-06-18 DIAGNOSIS — Z12.5 SCREENING FOR MALIGNANT NEOPLASM OF PROSTATE: ICD-10-CM

## 2024-06-18 DIAGNOSIS — E78.2 MIXED HYPERLIPIDEMIA: ICD-10-CM

## 2024-06-18 DIAGNOSIS — E66.09 CLASS 2 OBESITY DUE TO EXCESS CALORIES WITHOUT SERIOUS COMORBIDITY WITH BODY MASS INDEX (BMI) OF 36.0 TO 36.9 IN ADULT: ICD-10-CM

## 2024-06-18 DIAGNOSIS — I10 ESSENTIAL HYPERTENSION: Primary | ICD-10-CM

## 2024-06-18 PROCEDURE — 99214 OFFICE O/P EST MOD 30 MIN: CPT | Performed by: NURSE PRACTITIONER

## 2024-06-18 RX ORDER — AMLODIPINE BESYLATE 5 MG/1
5 TABLET ORAL DAILY
Qty: 90 TABLET | Refills: 1 | Status: SHIPPED | OUTPATIENT
Start: 2024-06-18

## 2024-06-18 RX ORDER — ATORVASTATIN CALCIUM 20 MG/1
20 TABLET, FILM COATED ORAL DAILY
Qty: 90 TABLET | Refills: 1 | Status: SHIPPED | OUTPATIENT
Start: 2024-06-18

## 2024-06-18 NOTE — PROGRESS NOTES
"Ambulatory Visit  Name: Wilder Pacheco      : 1970      MRN: [id number]  Encounter Provider: LIAM Prince  Encounter Date: 2024   Encounter department: [facility]    Assessment & Plan   1. Essential hypertension  Assessment & Plan:  Stable  Continue amlodipine   Orders:  -     amLODIPine (NORVASC) 5 mg tablet; Take 1 tablet (5 mg total) by mouth daily  2. FATOUMATA (obstructive sleep apnea)  Assessment & Plan:  Sleep study scheduled next month.   3. Mixed hyperlipidemia  Assessment & Plan:  Lipids are up, he recently restarted his statin.    Orders:  -     atorvastatin (LIPITOR) 20 mg tablet; Take 1 tablet (20 mg total) by mouth daily  -     Comprehensive metabolic panel; Future  -     Lipid Panel with Direct LDL reflex; Future  4. Class 2 obesity due to excess calories without serious comorbidity with body mass index (BMI) of 36.0 to 36.9 in adult  Assessment & Plan:  Recently started on wegovy.     Orders:  -     TSH, 3rd generation with Free T4 reflex; Future  -     CBC and differential; Future  5. Screening for malignant neoplasm of prostate  -     PSA, Total Screen; Future       History of Present Illness     Wilder is here today for follow up  He is doing well.  He was recently started on wegovy. He has noticed he gets sawyer quicker. Has not yet lost weight.           Review of Systems   Constitutional:  Negative for activity change, appetite change and fatigue.   Respiratory:  Negative for cough and shortness of breath.    Cardiovascular:  Negative for chest pain, palpitations and leg swelling.   Gastrointestinal:  Negative for abdominal pain.   Genitourinary:  Negative for difficulty urinating.   Neurological:  Negative for dizziness, light-headedness and headaches.       Objective     /80 (BP Location: Left arm, Patient Position: Sitting, Cuff Size: Large)   Pulse 75   Temp 97.6 °F (36.4 °C) (Temporal)   Resp 16   Ht 5' 8\" (1.727 m)   Wt 108 kg " (238 lb)   SpO2 98%   BMI 36.19 kg/m²     Physical Exam  Vitals reviewed.   Constitutional:       Appearance: Normal appearance.   HENT:      Head: Normocephalic and atraumatic.   Eyes:      Conjunctiva/sclera: Conjunctivae normal.   Cardiovascular:      Rate and Rhythm: Normal rate and regular rhythm.      Heart sounds: Normal heart sounds.   Pulmonary:      Effort: Pulmonary effort is normal.      Breath sounds: Normal breath sounds.   Neurological:      Mental Status: He is alert and oriented to person, place, and time.   Psychiatric:         Mood and Affect: Mood normal.         Behavior: Behavior normal.       Administrative Statements

## 2024-07-10 ENCOUNTER — HOSPITAL ENCOUNTER (OUTPATIENT)
Dept: SLEEP CENTER | Facility: CLINIC | Age: 54
Discharge: HOME | End: 2024-07-10
Payer: COMMERCIAL

## 2024-07-10 DIAGNOSIS — G47.33 OSA (OBSTRUCTIVE SLEEP APNEA): ICD-10-CM

## 2024-07-10 PROCEDURE — G0399 HOME SLEEP TEST/TYPE 3 PORTA: HCPCS

## 2024-07-11 NOTE — PROGRESS NOTES
Home Sleep Study Documentation    HOME STUDY DEVICE: Noxturnal no                                           Krystina G3 yes      Pre-Sleep Home Study:    Set-up and instructions performed by: GUMARO    Technician performed demonstration for Patient: yes    Return demonstration performed by Patient: yes    Written instructions provided to Patient: yes    Patient signed consent form: yes        Post-Sleep Home Study:    Additional comments by Patient: N/A    Home Sleep Study Failed:no:    Failure reason: N/A    Reported or Detected: N/A    Scored by: RONNY Simon

## 2024-07-15 DIAGNOSIS — G47.33 OSA (OBSTRUCTIVE SLEEP APNEA): Primary | ICD-10-CM

## 2024-07-18 LAB
DME PARACHUTE DELIVERY DATE REQUESTED: NORMAL
DME PARACHUTE ITEM DESCRIPTION: NORMAL
DME PARACHUTE ORDER STATUS: NORMAL
DME PARACHUTE SUPPLIER NAME: NORMAL
DME PARACHUTE SUPPLIER PHONE: NORMAL

## 2024-08-09 ENCOUNTER — TELEPHONE (OUTPATIENT)
Dept: ENDOCRINOLOGY | Facility: CLINIC | Age: 54
End: 2024-08-09

## 2024-08-09 NOTE — TELEPHONE ENCOUNTER
Pt came into office stating he has been out of Wegovy and unable to find 0.5mg dose. Pt has been compensating last 2 weeks with samples of Ozempic given to him.    Patient states he has reached a plateau and would like to up his dose to 1.5 mg of Wegovy if okay with Dr. Pino. If so, please send to [facility] pharmacy on [facility]

## 2024-08-12 ENCOUNTER — DOCUMENTATION (OUTPATIENT)
Dept: ENDOCRINOLOGY | Facility: CLINIC | Age: 54
End: 2024-08-12

## 2024-08-16 DIAGNOSIS — E66.09 CLASS 2 OBESITY DUE TO EXCESS CALORIES WITHOUT SERIOUS COMORBIDITY WITH BODY MASS INDEX (BMI) OF 36.0 TO 36.9 IN ADULT: Primary | ICD-10-CM

## 2024-09-09 ENCOUNTER — TELEPHONE (OUTPATIENT)
Dept: CARDIOLOGY CLINIC | Facility: CLINIC | Age: 54
End: 2024-09-09

## 2024-09-09 DIAGNOSIS — E66.09 CLASS 2 OBESITY DUE TO EXCESS CALORIES WITHOUT SERIOUS COMORBIDITY WITH BODY MASS INDEX (BMI) OF 36.0 TO 36.9 IN ADULT: Primary | ICD-10-CM

## 2024-09-09 NOTE — TELEPHONE ENCOUNTER
Pt came into office today requesting refill of the Wegovy to be sent to the [facility] on [facility] [place] [place].  Pt states he has hit a plateau and would like to try the 1.7 mg if possible. Informed patient that would be under the covering provider's discretion.      Patient states he is completely out and due to take the medication tomorrow and inquired about getting Ozempic samples. Informed patient without Dr. Pino's consent we unfortunately are unable to provide samples.

## 2024-09-26 ENCOUNTER — OFFICE VISIT (OUTPATIENT)
Dept: PULMONOLOGY | Facility: CLINIC | Age: 54
End: 2024-09-26
Payer: COMMERCIAL

## 2024-09-26 VITALS
HEIGHT: 68 IN | WEIGHT: 244 LBS | OXYGEN SATURATION: 98 % | BODY MASS INDEX: 36.98 KG/M2 | SYSTOLIC BLOOD PRESSURE: 130 MMHG | HEART RATE: 84 BPM | TEMPERATURE: 98 F | DIASTOLIC BLOOD PRESSURE: 78 MMHG

## 2024-09-26 DIAGNOSIS — G47.33 OSA (OBSTRUCTIVE SLEEP APNEA): Primary | ICD-10-CM

## 2024-09-26 DIAGNOSIS — E66.812 CLASS 2 OBESITY DUE TO EXCESS CALORIES WITHOUT SERIOUS COMORBIDITY WITH BODY MASS INDEX (BMI) OF 36.0 TO 36.9 IN ADULT: ICD-10-CM

## 2024-09-26 DIAGNOSIS — E66.09 CLASS 2 OBESITY DUE TO EXCESS CALORIES WITHOUT SERIOUS COMORBIDITY WITH BODY MASS INDEX (BMI) OF 36.0 TO 36.9 IN ADULT: ICD-10-CM

## 2024-09-26 DIAGNOSIS — I10 ESSENTIAL HYPERTENSION: ICD-10-CM

## 2024-09-26 PROCEDURE — 99214 OFFICE O/P EST MOD 30 MIN: CPT | Performed by: INTERNAL MEDICINE

## 2024-09-26 NOTE — ASSESSMENT & PLAN NOTE
. Wilder Pacheco has had a long history of snoring, interrupted sleep and waking up episodes during sleep gasping for air witnessed apneas.  He woke up not rested and felt sleepy and tired during the day.  He had nocturia. His [place] sleepiness score was 9 out of 24.  On clinical examination, he was obese and had oropharyngeal crowding.  His overnight sleep study which was done at home showed severe obstructive sleep apnea with HARRISON of 34.6 and oxygen desaturation to 83%.  I discussed with him the various options for therapy for his severe sleep apnea.  He agreed to give a try with auto CPAP 5 to 15 cm of water using a mask/interface of patient choice.  This has been reordered.  I had a long discussion with him and answered all his questions.  I have advised him regarding driving and use of sedative medications and alcohol.    Orders:    CPAP Auto New DME

## 2024-09-26 NOTE — ASSESSMENT & PLAN NOTE
He is very obese and understands the need for weight reduction.  He understands that weight reduction can significantly improve his sleep apnea and other symptoms.  He is currently on Wegovy and follows with Dr. Pino.  He has not been able to lose significant weight so far.

## 2024-09-26 NOTE — PROGRESS NOTES
Ambulatory Visit  Name: Wilder Pacheco      : 1970      MRN: [id number]  Encounter Provider: Jonnie Chapa MD  Encounter Date: 2024   [facility]    Assessment & Plan  FATOUMATA (obstructive sleep apnea)  Mr. Wilder Pacheco has had a long history of snoring, interrupted sleep and waking up episodes during sleep gasping for air witnessed apneas.  He woke up not rested and felt sleepy and tired during the day.  He had nocturia. His [place] sleepiness score was 9 out of 24.  On clinical examination, he was obese and had oropharyngeal crowding.  His overnight sleep study which was done at home showed severe obstructive sleep apnea with HARRISON of 34.6 and oxygen desaturation to 83%.  I discussed with him the various options for therapy for his severe sleep apnea.  He agreed to give a try with auto CPAP 5 to 15 cm of water using a mask/interface of patient choice.  This has been reordered.  I had a long discussion with him and answered all his questions.  I have advised him regarding driving and use of sedative medications and alcohol.    Orders:    CPAP Auto New DME    Essential hypertension  He has history of hypertension and currently this is controlled with amlodipine.       Class 2 obesity due to excess calories without serious comorbidity with body mass index (BMI) of 36.0 to 36.9 in adult  He is very obese and understands the need for weight reduction.  He understands that weight reduction can significantly improve his sleep apnea and other symptoms.  He is currently on Wegovy and follows with Dr. Pino.  He has not been able to lose significant weight so far.         History of Present Illness     Wilder Pacheco is a 54 y.o. male with past medical history of obstructive sleep apnea which was diagnosed few months back on an overnight home sleep study which showed a HARRISON of 34.6 with oxygen desaturation to 83% and about 6 percentage of  "time spent with oxygen saturation less than 90%.  He has been feeling sleepy and tired during the day and his sleep is disturbed.  He is obese and has been on treatment with Wegovy.  He also has hypertension.  He stated that his weight has not come down in spite of taking Wegovy.  He follows with Dr. Pino.  After the home sleep study CPAP therapy on auto mode was ordered by my colleague but he did not start using the CPAP.  He wanted to have a discussion again before starting on CPAP.  I discussed with him the various options.  He is aware of the complications of untreated sleep apnea.  He has been worried about using CPAP.  He was wondering how he will live with the CPAP.  He does not smoke.  Do not consume alcohol regularly.  He has no diabetes.  I have reordered the CPAP therapy.    Review of Systems   Constitutional:  Positive for fatigue. Negative for appetite change, chills and fever.   HENT:  Negative for hearing loss, rhinorrhea, sneezing and trouble swallowing.    Respiratory:  Negative for cough, chest tightness, shortness of breath and wheezing.    Cardiovascular:  Negative for chest pain, palpitations and leg swelling.   Gastrointestinal:  Negative for abdominal pain, constipation, diarrhea, nausea and vomiting.   Genitourinary:  Negative for dysuria, frequency and urgency.   Musculoskeletal:  Negative for arthralgias, back pain and gait problem.   Skin:  Negative for rash.   Allergic/Immunologic: Positive for environmental allergies.   Neurological:  Negative for dizziness, syncope, light-headedness and headaches.   Psychiatric/Behavioral:  Positive for sleep disturbance. Negative for agitation and confusion.            Objective     /78 (BP Location: Left arm, Patient Position: Sitting, Cuff Size: Standard)   Pulse 84   Temp 98 °F (36.7 °C) (Temporal)   Ht 5' 8\" (1.727 m)   Wt 111 kg (244 lb)   SpO2 98%   BMI 37.10 kg/m²     Physical Exam  Constitutional:       General: He is not in " acute distress.     Appearance: He is obese. He is not ill-appearing, toxic-appearing or diaphoretic.   HENT:      Head: Normocephalic.      Mouth/Throat:      Mouth: Mucous membranes are moist.      Pharynx: Oropharynx is clear.      Comments: Oropharyngeal crowding.  Eyes:      General: No scleral icterus.     Conjunctiva/sclera: Conjunctivae normal.   Cardiovascular:      Rate and Rhythm: Normal rate and regular rhythm.      Heart sounds: Normal heart sounds. No murmur heard.  Pulmonary:      Effort: Pulmonary effort is normal. No respiratory distress.      Breath sounds: Normal breath sounds. No wheezing, rhonchi or rales.   Abdominal:      General: Bowel sounds are normal.      Palpations: Abdomen is soft.      Tenderness: There is no abdominal tenderness. There is no guarding.   Musculoskeletal:      Cervical back: Neck supple. No rigidity.      Right lower leg: No edema.      Left lower leg: No edema.   Lymphadenopathy:      Cervical: No cervical adenopathy.   Skin:     Coloration: Skin is not jaundiced or pale.      Findings: No rash.   Neurological:      Mental Status: He is alert and oriented to person, place, and time.      Gait: Gait normal.   Psychiatric:         Mood and Affect: Mood normal.         Behavior: Behavior normal.         Thought Content: Thought content normal.         Judgment: Judgment normal.     I spent 30 minutes of time take care of this patient with complex medical issue.  The majority of this time was spent directly with the patient counseling as well as coordinating care.

## 2024-10-04 LAB

## 2024-10-16 LAB

## 2024-11-04 ENCOUNTER — OFFICE VISIT (OUTPATIENT)
Dept: ENDOCRINOLOGY | Facility: CLINIC | Age: 54
End: 2024-11-04
Payer: COMMERCIAL

## 2024-11-04 VITALS
OXYGEN SATURATION: 98 % | HEIGHT: 68 IN | DIASTOLIC BLOOD PRESSURE: 100 MMHG | BODY MASS INDEX: 36.98 KG/M2 | TEMPERATURE: 98 F | WEIGHT: 244 LBS | SYSTOLIC BLOOD PRESSURE: 142 MMHG | HEART RATE: 90 BPM

## 2024-11-04 DIAGNOSIS — E66.812 CLASS 2 OBESITY DUE TO EXCESS CALORIES WITHOUT SERIOUS COMORBIDITY WITH BODY MASS INDEX (BMI) OF 36.0 TO 36.9 IN ADULT: Primary | ICD-10-CM

## 2024-11-04 DIAGNOSIS — E66.09 CLASS 2 OBESITY DUE TO EXCESS CALORIES WITHOUT SERIOUS COMORBIDITY WITH BODY MASS INDEX (BMI) OF 36.0 TO 36.9 IN ADULT: Primary | ICD-10-CM

## 2024-11-04 DIAGNOSIS — N62 GYNECOMASTIA, MALE: ICD-10-CM

## 2024-11-04 DIAGNOSIS — E78.2 MIXED HYPERLIPIDEMIA: ICD-10-CM

## 2024-11-04 PROCEDURE — 99214 OFFICE O/P EST MOD 30 MIN: CPT | Performed by: INTERNAL MEDICINE

## 2024-11-04 RX ORDER — TOPIRAMATE 50 MG/1
50 TABLET, FILM COATED ORAL EVERY 12 HOURS SCHEDULED
Qty: 90 TABLET | Refills: 3 | Status: SHIPPED | OUTPATIENT
Start: 2024-11-04

## 2024-11-04 RX ORDER — PHENTERMINE HYDROCHLORIDE 30 MG/1
30 CAPSULE ORAL EVERY MORNING
Qty: 90 CAPSULE | Refills: 1 | Status: SHIPPED | OUTPATIENT
Start: 2024-11-04

## 2024-11-04 RX ORDER — SEMAGLUTIDE 1.7 MG/.75ML
1.7 INJECTION, SOLUTION SUBCUTANEOUS WEEKLY
COMMUNITY
End: 2024-11-04

## 2024-11-04 NOTE — PATIENT INSTRUCTIONS
Patient Education     Phentermine (FEN ter meen)   Brand Names: [place] Adipex-P; Lomaira   Brand Names: [place] RHO-Phentermine   What is this drug used for?   It is used for weight loss.  What do I need to tell my doctor BEFORE I take this drug?   If you are allergic to this drug; any part of this drug; or any other drugs, foods, or substances. Tell your doctor about the allergy and what signs you had.  If you have ever had any of these health problems: Heart disease like heart failure or a heartbeat that is not normal, drug abuse, high blood pressure, or stroke.  If you have any of these health problems: Glaucoma, agitation, anxiety, or overactive thyroid.  If you have kidney disease or are on dialysis.  If you have taken certain drugs for depression or Parkinson's disease in the last 14 days. This includes isocarboxazid, phenelzine, tranylcypromine, selegiline, or rasagiline. Very high blood pressure may happen.  If you are taking any of these drugs: Fluoxetine, fluvoxamine, linezolid, methylene blue, paroxetine, or sertraline.  If you are taking any other drug (prescription or OTC, natural product) for weight loss.  If you are pregnant or may be pregnant. Do not take this drug if you are pregnant.  If you are breast-feeding. Do not breast-feed while you take this drug.  This is not a list of all drugs or health problems that interact with this drug.  Tell your doctor and pharmacist about all of your drugs (prescription or OTC, natural products, vitamins) and health problems. You must check to make sure that it is safe for you to take this drug with all of your drugs and health problems. Do not start, stop, or change the dose of any drug without checking with your doctor.  What are some things I need to know or do while I take this drug?   Tell all of your health care providers that you take this drug. This includes your doctors, nurses, pharmacists, and dentists.  Avoid driving and doing other tasks or actions that  call for you to be alert until you see how this drug affects you.  Do not take this drug for longer than you were told by your doctor.  If you have been taking this drug for a long time or at high doses, it may not work as well and you may need higher doses to get the same effect. This is known as tolerance. Call your doctor if this drug stops working well. Do not take more than ordered.  If you have been taking this drug for many weeks, talk with your doctor before stopping. You may want to slowly stop this drug.  This drug may be habit-forming; avoid long-term use. Tell your doctor if you have a history of drug or alcohol abuse.  You may need to have some heart tests before starting this drug. If you have questions, talk with your doctor.  Check blood pressure and heart rate as the doctor has told you.  If you have high blood sugar (diabetes) and take drugs to lower blood sugar, talk with your doctor. Weight loss may raise the chance of low blood sugar if you take drugs to lower blood sugar. Call your doctor right away if you have signs of low blood sugar like dizziness, headache, feeling sleepy, feeling weak, shaking, a fast heartbeat, confusion, hunger, or sweating.  Follow the diet and workout plan that your doctor told you about.  Talk with your doctor before you drink alcohol.  Do not give to a child younger than 17 years of age.  What are some side effects that I need to call my doctor about right away?   WARNING/CAUTION: Even though it may be rare, some people may have very bad and sometimes deadly side effects when taking a drug. Tell your doctor or get medical help right away if you have any of the following signs or symptoms that may be related to a very bad side effect:  Signs of an allergic reaction, like rash; hives; itching; red, swollen, blistered, or peeling skin with or without fever; wheezing; tightness in the chest or throat; trouble breathing, swallowing, or talking; unusual hoarseness; or  swelling of the mouth, face, lips, tongue, or throat.  Signs of high blood pressure like very bad headache or dizziness, passing out, or change in eyesight.  Change in how you act.  Mood changes.  Shakiness.  Rarely, people taking drugs for weight loss like this drug have had heart valve problems or raised pressure in the lungs. Raised pressure in the lungs is often deadly. Call your doctor right away if you have dizziness or passing out, tiredness, or weakness that will not go away; fast or abnormal heartbeat; chest pain; shortness of breath; or swelling in your arms or legs. Call your doctor right away if you are not able to exercise as much.  What are some other side effects of this drug?   All drugs may cause side effects. However, many people have no side effects or only have minor side effects. Call your doctor or get medical help if any of these side effects or any other side effects bother you or do not go away:  Dizziness or headache.  Feeling nervous and excitable.  Diarrhea or constipation.  Dry mouth.  Trouble sleeping.  Bad taste in your mouth.  Lowered interest in sex.  Not able to get or keep an erection.  Restlessness.  These are not all of the side effects that may occur. If you have questions about side effects, call your doctor. Call your doctor for medical advice about side effects.  You may report side effects to your national health agency.  You may report side effects to the FDA at 1-394.763.6068. You may also report side effects at https://www.fda.gov/medwatch.  How is this drug best taken?   Use this drug as ordered by your doctor. Read all information given to you. Follow all instructions closely.  Some products are taken 1 time a day, either before breakfast or 1 to 2 hours after breakfast. Some products are taken more than 1 time a day, 30 minutes before meals. Be sure you know how to take this drug.  Avoid taking this drug late in the evening to prevent sleep problems.  What do I do if I  miss a dose?   Take a missed dose as soon as you think about it.  If it is close to the time for your next dose, skip the missed dose and go back to your normal time.  Do not take 2 doses at the same time or extra doses.  How do I store and/or throw out this drug?   Store at room temperature in a dry place. Do not store in a bathroom.  Store this drug in a safe place where children cannot see or reach it, and where other people cannot get to it. A locked box or area may help keep this drug safe. Keep all drugs away from pets.  Throw away unused or  drugs. Do not flush down a toilet or pour down a drain unless you are told to do so. Check with your pharmacist if you have questions about the best way to throw out drugs. There may be drug take-back programs in your area.  General drug facts   If your symptoms or health problems do not get better or if they become worse, call your doctor.  Do not share your drugs with others and do not take anyone else's drugs.  Some drugs may have another patient information leaflet. If you have any questions about this drug, please talk with your doctor, nurse, pharmacist, or other health care provider.  Some drugs may have another patient information leaflet. Check with your pharmacist. If you have any questions about this drug, please talk with your doctor, nurse, pharmacist, or other health care provider.  If you think there has been an overdose, call your poison control center or get medical care right away. Be ready to tell or show what was taken, how much, and when it happened.  Consumer Information Use and Disclaimer   This generalized information is a limited summary of diagnosis, treatment, and/or medication information. It is not meant to be comprehensive and should be used as a tool to help the user understand and/or assess potential diagnostic and treatment options. It does NOT include all information about conditions, treatments, medications, side effects, or risks  that may apply to a specific patient. It is not intended to be medical advice or a substitute for the medical advice, diagnosis, or treatment of a health care provider based on the health care provider's examination and assessment of a patient's specific and unique circumstances. Patients must speak with a health care provider for complete information about their health, medical questions, and treatment options, including any risks or benefits regarding use of medications. This information does not endorse any treatments or medications as safe, effective, or approved for treating a specific patient. Up[employer] Inc[employer] and its affiliates disclaim any warranty or liability relating to this information or the use thereof. The use of this information is governed by the Terms of Use, available at https://www.[employer].com/en/know/clinical-effectiveness-terms.  Last Reviewed Date   2024-01-29  Copyright   © 2024 [employer] and its affiliates and/or licensors. All rights reserved.    Patient Education     Topiramate (toe PYRE a mate)   Brand Names: [place] Eprontia; Qudexy XR; Topamax; Topamax Sprinkle; Trokendi XR   Brand Names: [place] ACH-Topiramate; AG-Topiramate; APO-Topiramate; Auro-Topiramate; DOM-Topiramate [DSC]; GLN-Topiramate; JAMP-Topiramate; Mar-Topiramate [DSC]; MINT-Topiramate; MYLAN-Topiramate; NRA-Topiramate; PMS-Topiramate; PRO-Topiramate; RAN-Topiramate [DSC]; SANDOZ Topiramate [DSC]; TEVA-Topiramate; Topamax; Topamax Sprinkle   What is this drug used for?   It is used to treat seizures.  It is used to prevent migraine headaches.  It may be given to you for other reasons. Talk with the doctor.  What do I need to tell my doctor BEFORE I take this drug?   If you are allergic to this drug; any part of this drug; or any other drugs, foods, or substances. Tell your doctor about the allergy and what signs you had.  This drug may interact with other drugs or health problems.  Tell your doctor and pharmacist about  all of your drugs (prescription or OTC, natural products, vitamins) and health problems. You must check to make sure that it is safe for you to take this drug with all of your drugs and health problems. Do not start, stop, or change the dose of any drug without checking with your doctor.  What are some things I need to know or do while I take this drug?   Avoid driving and doing other tasks or actions that call for you to be alert until you see how this drug affects you.  Sweating less and high body temperatures have happened with this drug. Sometimes, this has led to the need for treatment in a hospital. Be careful in hot weather and while being active. Call your doctor right away if you have a fever or you do not sweat during activities or in warm temperatures.  Like other drugs that may be used for seizures, this drug may rarely raise the risk of suicidal thoughts or actions. The risk may be higher in people who have had suicidal thoughts or actions in the past. Call the doctor right away about any new or worse signs like depression; feeling nervous, restless, or grouchy; panic attacks; or other changes in mood or behavior. Call the doctor right away if any suicidal thoughts or actions occur.  This drug may cause an acid blood problem (metabolic acidosis). The chance may be higher in children and in people with kidney problems, breathing problems, or diarrhea. The chance may also be higher if you take certain other drugs, if you have surgery, or if you are on a ketogenic diet. Over time, metabolic acidosis can cause kidney stones, bone problems, or growth problems in children.  This drug may raise the chance of bleeding. Sometimes, bleeding can be life-threatening. Talk with the doctor.  This drug may cause very bad eye problems. If left untreated, this can lead to lasting eyesight loss. Call your doctor right away if you have new eye signs like blurred eyesight or other changes in eyesight, eye pain, or eye  redness.  A severe skin reaction (Freeman-Matt syndrome/toxic epidermal necrolysis) may happen. It can cause severe health problems that may not go away, and sometimes death. Get medical help right away if you have signs like red, swollen, blistered, or peeling skin (with or without fever); red or irritated eyes; or sores in your mouth, throat, nose, or eyes.  If the patient is a child, use this drug with care. The risk of some side effects may be higher in children.  This drug may affect growth in children and teens in some cases. They may need regular growth checks. Talk with the doctor.  Birth control pills and other hormone-based birth control may not work as well to prevent pregnancy. Use some other kind of birth control also like a condom when taking this drug.  If you are taking hormone-based birth control and you have any change in your bleeding pattern, talk with your doctor.  This drug may cause harm to the unborn baby if you take it while you are pregnant. If you are pregnant or you get pregnant while taking this drug, call your doctor right away.  If you are able to get pregnant but do not want to get pregnant, use birth control that you can trust to prevent pregnancy while taking this drug.  Tell your doctor if you are breast-feeding. You will need to talk about any risks to your baby.  What are some side effects that I need to call my doctor about right away?   WARNING/CAUTION: Even though it may be rare, some people may have very bad and sometimes deadly side effects when taking a drug. Tell your doctor or get medical help right away if you have any of the following signs or symptoms that may be related to a very bad side effect:  Signs of an allergic reaction, like rash; hives; itching; red, swollen, blistered, or peeling skin with or without fever; wheezing; tightness in the chest or throat; trouble breathing, swallowing, or talking; unusual hoarseness; or swelling of the mouth, face, lips,  tongue, or throat.  Signs of too much acid in the blood (acidosis) like confusion; fast breathing; fast heartbeat; a heartbeat that does not feel normal; very bad stomach pain, upset stomach, or throwing up; feeling very sleepy; shortness of breath; or feeling very tired or weak.  Signs of infection like fever, chills, very bad sore throat, ear or sinus pain, cough, more sputum or change in color of sputum, pain with passing urine, mouth sores, or wound that will not heal.  Signs of high ammonia levels like a heartbeat that does not feel normal, breathing that is not normal, feeling confused, pale skin, slow heartbeat, seizures, sweating, throwing up, or twitching.  Any unexplained bruising or bleeding.  Feeling confused, not able to focus, or change in behavior.  Memory problems or loss.  Trouble speaking.  Trouble sleeping.  Change in balance.  Very bad dizziness or passing out.  Not able to eat.  Back pain, belly pain, or blood in the urine. May be signs of a kidney stone.  A burning, numbness, or tingling feeling that is not normal.  Bone pain.  Not able to control eye movements.  Liver problems have rarely happened with this drug. Sometimes, this has been deadly. Call your doctor right away if you have signs of liver problems like dark urine, tiredness, decreased appetite, upset stomach or stomach pain, light-colored stools, throwing up, or yellow skin or eyes.  What are some other side effects of this drug?   All drugs may cause side effects. However, many people have no side effects or only have minor side effects. Call your doctor or get medical help if any of these side effects or any other side effects bother you or do not go away:  Constipation, diarrhea, stomach pain, upset stomach, throwing up, or decreased appetite.  Change in taste.  Weight loss.  Feeling nervous and excitable.  Feeling dizzy, sleepy, tired, or weak.  Headache.  Flushing.  Signs of a common cold.  Joint pain.  These are not all of the  side effects that may occur. If you have questions about side effects, call your doctor. Call your doctor for medical advice about side effects.  You may report side effects to your national health agency.  You may report side effects to the FDA at 1-391.250.2695. You may also report side effects at https://www.fda.gov/medwatch.  How is this drug best taken?   Use this drug as ordered by your doctor. Read all information given to you. Follow all instructions closely.  All products:   Take with or without food.  Keep taking this drug as you have been told by your doctor or other health care provider, even if you feel well.  Do not stop taking this drug all of a sudden without calling your doctor. You may have a greater risk of seizures. If you need to stop this drug, you will want to slowly stop it as ordered by your doctor.  Drink lots of noncaffeine liquids unless told to drink less liquid by your doctor.  Tell all of your health care providers that you take this drug. This includes your doctors, nurses, pharmacists, and dentists.  Have blood work checked as you have been told by the doctor. Talk with the doctor.  Talk with your doctor before you use marijuana, other forms of cannabis, or prescription or OTC drugs that may slow your actions.  Taking this drug with valproic acid can cause low body temperature. This can also cause tiredness, confusion, or coma. Talk with the doctor.  Tablets:   Swallow whole. Do not chew, break, or crush.  Avoid drinking alcohol while taking this drug.  Regular-release sprinkle capsules and extended-release sprinkle capsules:   You may swallow whole or sprinkle the contents on a spoonful of soft food like applesauce. Do not crush or chew before you swallow.  If mixed, swallow the mixed drug right away. Do not store for use at a later time.  Drink fluids right after eating the food and drug mixture to make sure the drug is swallowed.  Avoid drinking alcohol while taking this  drug.  Extended-release capsules:   Swallow whole. Do not chew, open, or crush.  Do not sprinkle this drug on food.  Avoid drinking alcohol while taking this drug. This is most important within 6 hours before or 6 hours after taking this drug.  Oral solution:   Measure liquid doses carefully. Use the measuring device that comes with this drug. If there is none, ask the pharmacist for a device to measure this drug.  Do not use a household teaspoon or tablespoon to measure this drug. Doing so could lead to the dose being too high.  Avoid drinking alcohol while taking this drug.  What do I do if I miss a dose?   Extended-release capsules:   Call your doctor to find out what to do.  Extended-release sprinkle capsules:   Take a missed dose as soon as you think about it.  If it is close to the time for your next dose, skip the missed dose and go back to your normal time.  Do not take 2 doses at the same time or extra doses.  If you miss 2 doses, call your doctor.  All other products:   Take a missed dose as soon as you think about it.  If it is less than 6 hours until the next dose, skip the missed dose and go back to the normal time.  Do not take 2 doses at the same time or extra doses.  If you miss 2 doses, call your doctor.  How do I store and/or throw out this drug?   All products:   Store at room temperature in a dry place. Do not store in a bathroom.  Keep lid tightly closed.  Keep all drugs in a safe place. Keep all drugs out of the reach of children and pets.  Throw away unused or  drugs. Do not flush down a toilet or pour down a drain unless you are told to do so. Check with your pharmacist if you have questions about the best way to throw out drugs. There may be drug take-back programs in your area.  Oral solution:   Throw away any part not used 90 days after opening.  Extended-release capsules:   Protect from light.  General drug facts   If your symptoms or health problems do not get better or if they  become worse, call your doctor.  Do not share your drugs with others and do not take anyone else's drugs.  Some drugs may have another patient information leaflet. If you have any questions about this drug, please talk with your doctor, nurse, pharmacist, or other health care provider.  This drug comes with an extra patient fact sheet called a Medication Guide. Read it with care. Read it again each time this drug is refilled. If you have any questions about this drug, please talk with the doctor, pharmacist, or other health care provider.  If you think there has been an overdose, call your poison control center or get medical care right away. Be ready to tell or show what was taken, how much, and when it happened.  Consumer Information Use and Disclaimer   This generalized information is a limited summary of diagnosis, treatment, and/or medication information. It is not meant to be comprehensive and should be used as a tool to help the user understand and/or assess potential diagnostic and treatment options. It does NOT include all information about conditions, treatments, medications, side effects, or risks that may apply to a specific patient. It is not intended to be medical advice or a substitute for the medical advice, diagnosis, or treatment of a health care provider based on the health care provider's examination and assessment of a patient's specific and unique circumstances. Patients must speak with a health care provider for complete information about their health, medical questions, and treatment options, including any risks or benefits regarding use of medications. This information does not endorse any treatments or medications as safe, effective, or approved for treating a specific patient. [employer] and its affiliates disclaim any warranty or liability relating to this information or the use thereof. The use of this information is governed by the Terms of Use, available at  https://www.wolterskluwer.com/en/know/clinical-effectiveness-terms.  Last Reviewed Date   2023-06-21  Copyright   © 2024 [employer] and its affiliates and/or licensors. All rights reserved.

## 2024-11-04 NOTE — PROGRESS NOTES
"    Follow-up Patient Progress Note      CC: Gynecomastia, Weight gain     History of Present Illness:   53 yr male with HLD, obesity BMI 36, gynecomastia and weakness. Last visit was 2/15/24.    Since last visit, he gained 6 lbs. He has not responded to     Max adult weight 275 lbs age 40; minimum adult weight 190 lbs age 22yrs.     He reports occasional ED but normal libido. Sexually active and no testicular injury in past.    Physical Exam:  Body mass index is 37.1 kg/m².  /100 (BP Location: Left arm, Patient Position: Sitting, Cuff Size: Standard)   Pulse 90   Temp 98 °F (36.7 °C) (Temporal)   Ht 5' 8\" (1.727 m)   Wt 111 kg (244 lb)   SpO2 98%   BMI 37.10 kg/m²    Vitals:    11/04/24 1259   Weight: 111 kg (244 lb)        Physical Exam  Constitutional:       General: He is not in acute distress.     Appearance: He is well-developed.   HENT:      Head: Normocephalic and atraumatic.      Nose: Nose normal.   Eyes:      Conjunctiva/sclera: Conjunctivae normal.   Pulmonary:      Effort: Pulmonary effort is normal.   Abdominal:      General: There is no distension.   Musculoskeletal:      Cervical back: Normal range of motion and neck supple.   Skin:     Findings: No rash.      Comments: No icterus   Neurological:      Mental Status: He is alert and oriented to person, place, and time.         Labs:   No results found for: \"HGBA1C\"    Lab Results   Component Value Date    GME6JXZCOFZK 1.378 07/07/2023       Lab Results   Component Value Date    CREATININE 0.68 04/30/2024    CREATININE 0.83 12/06/2023    CREATININE 0.74 07/07/2023    BUN 24 04/30/2024    K 3.9 04/30/2024     04/30/2024    CO2 28 04/30/2024     eGFR   Date Value Ref Range Status   04/30/2024 109 ml/min/1.73sq m Final       Lab Results   Component Value Date    ALT 22 04/30/2024    AST 15 04/30/2024    ALKPHOS 85 04/30/2024       Lab Results   Component Value Date    CHOLESTEROL 256 (H) 04/30/2024    CHOLESTEROL 262 (H) 12/06/2023    " "CHOLESTEROL 260 (H) 07/07/2023     Lab Results   Component Value Date    HDL 76 04/30/2024    HDL 85 12/06/2023    HDL 77 07/07/2023     Lab Results   Component Value Date    TRIG 92 04/30/2024    TRIG 112 12/06/2023    TRIG 132 07/07/2023     No results found for: \"NONHDLC\"      Assessment/Plan:    1. Class 2 obesity due to excess calories without serious comorbidity with body mass index (BMI) of 36.0 to 36.9 in adult  Assessment & Plan:  He gained weight in spite of progressing to Wegovy 1.7mg weekly dose. He reports mild loss of appetite. I suspect he has developed resistance to GLP1 agonist. Note use of liquid calories can bypass  GLP1 activity.      Today we discussed options and agreed to reinitiate diet/lifestyle and switch from GLP1 agonist to phentermine/Topamax combination for next 3-6 months. Hopefully no side effects. Written information provided.    In future, we may reintroduce GLp1 agonist after a washout period of about 6 months.  Follow up in 6 months.    Orders:  -     phentermine 30 MG capsule; Take 1 capsule (30 mg total) by mouth every morning  -     topiramate (Topamax) 50 MG tablet; Take 1 tablet (50 mg total) by mouth every 12 (twelve) hours  2. Mixed hyperlipidemia  Assessment & Plan:  Advised to reinitiate a statin as ASCVD risk is>5% and he has obesity.  3. Gynecomastia, male  Assessment & Plan:  Suspect mostly adiposity related. Note elevated estradiol and normal prolactin. May consider surgery if this is affecting QoL.        I have spent a total time of 30 minutes on 11/04/24 in caring for this patient including greater than 50% of this time was spent in counseling/coordination of care as listed above.       Discussed with the patient and all questioned fully answered. He will contact me with concerns.    Lisa Pino"

## 2024-11-04 NOTE — ASSESSMENT & PLAN NOTE
Suspect mostly adiposity related. Note elevated estradiol and normal prolactin. May consider surgery if this is affecting QoL.

## 2024-11-04 NOTE — ASSESSMENT & PLAN NOTE
He gained weight in spite of progressing to Wegovy 1.7mg weekly dose. He reports mild loss of appetite. I suspect he has developed resistance to GLP1 agonist. Note use of liquid calories can bypass  GLP1 activity.      Today we discussed options and agreed to reinitiate diet/lifestyle and switch from GLP1 agonist to phentermine/Topamax combination for next 3-6 months. Hopefully no side effects. Written information provided.    In future, we may reintroduce GLp1 agonist after a washout period of about 6 months.  Follow up in 6 months.

## 2024-11-18 DIAGNOSIS — E66.09 CLASS 2 OBESITY DUE TO EXCESS CALORIES WITHOUT SERIOUS COMORBIDITY WITH BODY MASS INDEX (BMI) OF 36.0 TO 36.9 IN ADULT: ICD-10-CM

## 2024-11-18 DIAGNOSIS — E66.812 CLASS 2 OBESITY DUE TO EXCESS CALORIES WITHOUT SERIOUS COMORBIDITY WITH BODY MASS INDEX (BMI) OF 36.0 TO 36.9 IN ADULT: ICD-10-CM

## 2024-11-19 RX ORDER — TOPIRAMATE 50 MG/1
50 TABLET, FILM COATED ORAL EVERY 12 HOURS
Qty: 180 TABLET | Refills: 1 | Status: SHIPPED | OUTPATIENT
Start: 2024-11-19

## 2024-12-20 ENCOUNTER — OFFICE VISIT (OUTPATIENT)
Dept: PULMONOLOGY | Facility: CLINIC | Age: 54
End: 2024-12-20
Payer: COMMERCIAL

## 2024-12-20 ENCOUNTER — TELEPHONE (OUTPATIENT)
Dept: ENDOCRINOLOGY | Facility: CLINIC | Age: 54
End: 2024-12-20

## 2024-12-20 VITALS
DIASTOLIC BLOOD PRESSURE: 90 MMHG | HEIGHT: 68 IN | WEIGHT: 252 LBS | OXYGEN SATURATION: 99 % | BODY MASS INDEX: 38.19 KG/M2 | HEART RATE: 76 BPM | SYSTOLIC BLOOD PRESSURE: 144 MMHG | TEMPERATURE: 97.9 F

## 2024-12-20 DIAGNOSIS — E66.09 CLASS 2 OBESITY DUE TO EXCESS CALORIES WITHOUT SERIOUS COMORBIDITY WITH BODY MASS INDEX (BMI) OF 36.0 TO 36.9 IN ADULT: ICD-10-CM

## 2024-12-20 DIAGNOSIS — G47.33 OSA (OBSTRUCTIVE SLEEP APNEA): Primary | ICD-10-CM

## 2024-12-20 DIAGNOSIS — I10 ESSENTIAL HYPERTENSION: ICD-10-CM

## 2024-12-20 DIAGNOSIS — E66.812 CLASS 2 OBESITY DUE TO EXCESS CALORIES WITHOUT SERIOUS COMORBIDITY WITH BODY MASS INDEX (BMI) OF 36.0 TO 36.9 IN ADULT: ICD-10-CM

## 2024-12-20 PROCEDURE — 99214 OFFICE O/P EST MOD 30 MIN: CPT | Performed by: INTERNAL MEDICINE

## 2024-12-20 NOTE — ASSESSMENT & PLAN NOTE
He had a history of hypertension and was on treatment with amlodipine which he stopped.  I have advised him to restart on antihypertensive medications.

## 2024-12-20 NOTE — ASSESSMENT & PLAN NOTE
He is very obese and understands need for weight reduction.  He was on Wegovy which has been discontinued now.  He understands that weight reduction can significantly improve his sleep apnea and other symptoms.

## 2024-12-20 NOTE — TELEPHONE ENCOUNTER
Pt was in office today checking out from another appt and was asking about Phentermine that was prescribed by Dr. Pino.      -He stated that [facility] has not had in stock and was suppose to take it along with the Topamax.  He is asking if there is something else that can be prescribed to take along with the Topamax and have that script [facility] on file.     -If not, can a script be sent for the Phentermine and Topamax to the [facility] on file.     -After pt left, I saw that there is a letter in media about the Phentermine being denied but no further update on it.   
What Type Of Note Output Would You Prefer (Optional)?: Bullet Format
How Severe Is Your Skin Lesion?: moderate
Has Your Skin Lesion Been Treated?: not been treated
Is This A New Presentation, Or A Follow-Up?: Skin Lesions
Additional History: New patient c/o skin growths in his groin, neck, & face. C/o them being present for years.

## 2024-12-20 NOTE — ASSESSMENT & PLAN NOTE
. Wilder Pacheco  had a long history of snoring, interrupted sleep and waking up episodes during sleep gasping for air witnessed apneas.  He woke up not rested and felt sleepy and felt tired during the day.  He had nocturia. His Marston sleepiness score was 9 out of 24.  On clinical examination, he was obese and had oropharyngeal crowding.  His overnight home sleep study showed severe obstructive sleep apnea with HARRISON of 34.6 and oxygen desaturation to 83%.  He is currently on treatment with auto CPAP 5 to 15 cm of water.  He is using this more or less regularly and is getting clinical benefit from CPAP therapy.  I reviewed his compliance records and they are satisfactory.  His residual AHI was low.  I have advised him to continue with CPAP therapy as before.  I had a long discussion with him and answered all his questions.

## 2024-12-30 DIAGNOSIS — E66.812 CLASS 2 OBESITY DUE TO EXCESS CALORIES WITHOUT SERIOUS COMORBIDITY WITH BODY MASS INDEX (BMI) OF 36.0 TO 36.9 IN ADULT: ICD-10-CM

## 2024-12-30 DIAGNOSIS — E66.09 CLASS 2 OBESITY DUE TO EXCESS CALORIES WITHOUT SERIOUS COMORBIDITY WITH BODY MASS INDEX (BMI) OF 36.0 TO 36.9 IN ADULT: ICD-10-CM

## 2024-12-30 RX ORDER — PHENTERMINE HYDROCHLORIDE 30 MG/1
30 CAPSULE ORAL EVERY MORNING
Qty: 90 CAPSULE | Refills: 1 | Status: SHIPPED | OUTPATIENT
Start: 2024-12-30

## 2024-12-30 RX ORDER — TOPIRAMATE 50 MG/1
50 TABLET, FILM COATED ORAL EVERY 12 HOURS
Qty: 180 TABLET | Refills: 1 | Status: SHIPPED | OUTPATIENT
Start: 2024-12-30

## 2025-01-06 ENCOUNTER — TELEPHONE (OUTPATIENT)
Age: 55
End: 2025-01-06

## 2025-01-06 ENCOUNTER — TELEPHONE (OUTPATIENT)
Dept: ENDOCRINOLOGY | Facility: CLINIC | Age: 55
End: 2025-01-06

## 2025-01-06 NOTE — TELEPHONE ENCOUNTER
Pt called in needing to reschedule his upcoming appointment. He needs to complete lab work prior to his appointment and is requesting the labs be mailed out to him.    Gabriel das

## 2025-01-06 NOTE — TELEPHONE ENCOUNTER
Called [facility] to check on patient's approval for Phentermines 30MG and Isaac from [employer] stated that there was just a hold on the medication due to needed information. I provided the information that was needed and medication was approved until 04/06/2025. I then called patient to let him know that the phentermine was approved but as per doctor directions he is supposed to combine this medication with Topamax 50MG and this is not covered by patient's insurances per encounter from 12/20/2024. I was not able to get in touch with patient so I left a detailed voice mail asking patient to call insurance to find out the reason of the denial and if there is any alternative for Topamax.

## 2025-01-15 ENCOUNTER — TELEPHONE (OUTPATIENT)
Dept: ENDOCRINOLOGY | Facility: CLINIC | Age: 55
End: 2025-01-15

## 2025-01-15 NOTE — TELEPHONE ENCOUNTER
I called patient to check the statues of Topamax and Phentermine and he stated that he has a new insurance and that he has not started taking this medications neither has picked them up, he said pharmacy keeps calling him letting him know medication is ready but he is afraid that it was ran by old insurance.   I asked patient for new insurance information and it seems like we do have it in chart.    New Insurance: [employer]  ID Number: [id number]  Group #: [id number]  Formerly [facility], [facility]–1983 option B  Issue #: [id number]

## 2025-01-22 NOTE — TELEPHONE ENCOUNTER
Patient came into office to ensure we had updated insurance info. Card scanned.     Pharmacy told him yesterday would be the last day he could  his scripts. He wants to know if we would need to send updated scripts so that he can pick them up. He asks that we contact him with update.

## 2025-01-28 DIAGNOSIS — E66.812 CLASS 2 OBESITY DUE TO EXCESS CALORIES WITHOUT SERIOUS COMORBIDITY WITH BODY MASS INDEX (BMI) OF 36.0 TO 36.9 IN ADULT: ICD-10-CM

## 2025-01-28 DIAGNOSIS — E66.09 CLASS 2 OBESITY DUE TO EXCESS CALORIES WITHOUT SERIOUS COMORBIDITY WITH BODY MASS INDEX (BMI) OF 36.0 TO 36.9 IN ADULT: ICD-10-CM

## 2025-01-28 RX ORDER — TOPIRAMATE 50 MG/1
50 TABLET, FILM COATED ORAL EVERY 12 HOURS
Qty: 180 TABLET | Refills: 1 | Status: SHIPPED | OUTPATIENT
Start: 2025-01-28

## 2025-01-28 NOTE — TELEPHONE ENCOUNTER
"Phone call from patient stating \"he has never started these medications\". Patient has new insurance, and would like these sent to pharmacy ASAP. Please contact patient when sent to pharmacy.   "

## 2025-01-30 RX ORDER — PHENTERMINE HYDROCHLORIDE 30 MG/1
30 CAPSULE ORAL EVERY MORNING
Qty: 90 CAPSULE | Refills: 1 | Status: SHIPPED | OUTPATIENT
Start: 2025-01-30

## 2025-02-15 ENCOUNTER — APPOINTMENT (OUTPATIENT)
Dept: LAB | Facility: CLINIC | Age: 55
End: 2025-02-15
Payer: COMMERCIAL

## 2025-02-15 DIAGNOSIS — E78.2 MIXED HYPERLIPIDEMIA: ICD-10-CM

## 2025-02-15 DIAGNOSIS — E66.09 CLASS 2 OBESITY DUE TO EXCESS CALORIES WITHOUT SERIOUS COMORBIDITY WITH BODY MASS INDEX (BMI) OF 36.0 TO 36.9 IN ADULT: ICD-10-CM

## 2025-02-15 DIAGNOSIS — E66.812 CLASS 2 OBESITY DUE TO EXCESS CALORIES WITHOUT SERIOUS COMORBIDITY WITH BODY MASS INDEX (BMI) OF 36.0 TO 36.9 IN ADULT: ICD-10-CM

## 2025-02-15 DIAGNOSIS — Z12.5 SCREENING FOR MALIGNANT NEOPLASM OF PROSTATE: ICD-10-CM

## 2025-02-15 LAB
ALBUMIN SERPL BCG-MCNC: 4.7 G/DL (ref 3.5–5)
ALP SERPL-CCNC: 94 U/L (ref 34–104)
ALT SERPL W P-5'-P-CCNC: 20 U/L (ref 7–52)
ANION GAP SERPL CALCULATED.3IONS-SCNC: 5 MMOL/L (ref 4–13)
AST SERPL W P-5'-P-CCNC: 15 U/L (ref 13–39)
BASOPHILS # BLD AUTO: 0.02 THOUSANDS/ΜL (ref 0–0.1)
BASOPHILS NFR BLD AUTO: 0 % (ref 0–1)
BILIRUB SERPL-MCNC: 0.7 MG/DL (ref 0.2–1)
BUN SERPL-MCNC: 32 MG/DL (ref 5–25)
CALCIUM SERPL-MCNC: 9.5 MG/DL (ref 8.4–10.2)
CHLORIDE SERPL-SCNC: 109 MMOL/L (ref 96–108)
CHOLEST SERPL-MCNC: 111 MG/DL (ref ?–200)
CO2 SERPL-SCNC: 26 MMOL/L (ref 21–32)
CREAT SERPL-MCNC: 0.98 MG/DL (ref 0.6–1.3)
EOSINOPHIL # BLD AUTO: 0.09 THOUSAND/ΜL (ref 0–0.61)
EOSINOPHIL NFR BLD AUTO: 2 % (ref 0–6)
ERYTHROCYTE [DISTWIDTH] IN BLOOD BY AUTOMATED COUNT: 12.3 % (ref 11.6–15.1)
GFR SERPL CREATININE-BSD FRML MDRD: 87 ML/MIN/1.73SQ M
GLUCOSE P FAST SERPL-MCNC: 101 MG/DL (ref 65–99)
HCT VFR BLD AUTO: 43.3 % (ref 36.5–49.3)
HDLC SERPL-MCNC: 45 MG/DL
HGB BLD-MCNC: 14.3 G/DL (ref 12–17)
IMM GRANULOCYTES # BLD AUTO: 0.01 THOUSAND/UL (ref 0–0.2)
IMM GRANULOCYTES NFR BLD AUTO: 0 % (ref 0–2)
LDLC SERPL CALC-MCNC: 53 MG/DL (ref 0–100)
LYMPHOCYTES # BLD AUTO: 1.1 THOUSANDS/ΜL (ref 0.6–4.47)
LYMPHOCYTES NFR BLD AUTO: 23 % (ref 14–44)
MCH RBC QN AUTO: 29.1 PG (ref 26.8–34.3)
MCHC RBC AUTO-ENTMCNC: 33 G/DL (ref 31.4–37.4)
MCV RBC AUTO: 88 FL (ref 82–98)
MONOCYTES # BLD AUTO: 0.52 THOUSAND/ΜL (ref 0.17–1.22)
MONOCYTES NFR BLD AUTO: 11 % (ref 4–12)
NEUTROPHILS # BLD AUTO: 3.1 THOUSANDS/ΜL (ref 1.85–7.62)
NEUTS SEG NFR BLD AUTO: 64 % (ref 43–75)
NRBC BLD AUTO-RTO: 0 /100 WBCS
PLATELET # BLD AUTO: 316 THOUSANDS/UL (ref 149–390)
PMV BLD AUTO: 9.1 FL (ref 8.9–12.7)
POTASSIUM SERPL-SCNC: 4.1 MMOL/L (ref 3.5–5.3)
PROT SERPL-MCNC: 7.3 G/DL (ref 6.4–8.4)
PSA SERPL-MCNC: 0.87 NG/ML (ref 0–4)
RBC # BLD AUTO: 4.91 MILLION/UL (ref 3.88–5.62)
SODIUM SERPL-SCNC: 140 MMOL/L (ref 135–147)
TRIGL SERPL-MCNC: 63 MG/DL (ref ?–150)
TSH SERPL DL<=0.05 MIU/L-ACNC: 1.95 UIU/ML (ref 0.45–4.5)
WBC # BLD AUTO: 4.84 THOUSAND/UL (ref 4.31–10.16)

## 2025-02-15 PROCEDURE — 36415 COLL VENOUS BLD VENIPUNCTURE: CPT

## 2025-02-15 PROCEDURE — 84443 ASSAY THYROID STIM HORMONE: CPT

## 2025-02-15 PROCEDURE — 80061 LIPID PANEL: CPT

## 2025-02-15 PROCEDURE — 85025 COMPLETE CBC W/AUTO DIFF WBC: CPT

## 2025-02-15 PROCEDURE — G0103 PSA SCREENING: HCPCS

## 2025-02-15 PROCEDURE — 80053 COMPREHEN METABOLIC PANEL: CPT

## 2025-02-18 ENCOUNTER — OFFICE VISIT (OUTPATIENT)
Dept: INTERNAL MEDICINE CLINIC | Facility: CLINIC | Age: 55
End: 2025-02-18
Payer: COMMERCIAL

## 2025-02-18 VITALS
OXYGEN SATURATION: 100 % | TEMPERATURE: 97 F | DIASTOLIC BLOOD PRESSURE: 76 MMHG | HEART RATE: 85 BPM | SYSTOLIC BLOOD PRESSURE: 118 MMHG | BODY MASS INDEX: 35.31 KG/M2 | WEIGHT: 233 LBS | HEIGHT: 68 IN

## 2025-02-18 DIAGNOSIS — Z12.11 SCREENING FOR COLON CANCER: ICD-10-CM

## 2025-02-18 DIAGNOSIS — M25.552 LEFT HIP PAIN: ICD-10-CM

## 2025-02-18 DIAGNOSIS — I10 ESSENTIAL HYPERTENSION: ICD-10-CM

## 2025-02-18 DIAGNOSIS — Z00.00 ANNUAL PHYSICAL EXAM: Primary | ICD-10-CM

## 2025-02-18 DIAGNOSIS — G47.33 OSA (OBSTRUCTIVE SLEEP APNEA): ICD-10-CM

## 2025-02-18 DIAGNOSIS — E78.2 MIXED HYPERLIPIDEMIA: ICD-10-CM

## 2025-02-18 PROCEDURE — 99396 PREV VISIT EST AGE 40-64: CPT | Performed by: NURSE PRACTITIONER

## 2025-02-18 PROCEDURE — 99214 OFFICE O/P EST MOD 30 MIN: CPT | Performed by: NURSE PRACTITIONER

## 2025-02-18 RX ORDER — ATORVASTATIN CALCIUM 20 MG/1
20 TABLET, FILM COATED ORAL DAILY
Qty: 90 TABLET | Refills: 1 | Status: SHIPPED | OUTPATIENT
Start: 2025-02-18

## 2025-02-18 RX ORDER — AMLODIPINE BESYLATE 5 MG/1
5 TABLET ORAL DAILY
Qty: 90 TABLET | Refills: 1 | Status: SHIPPED | OUTPATIENT
Start: 2025-02-18

## 2025-02-18 NOTE — ASSESSMENT & PLAN NOTE
Stable.  On amlodipine.     Orders:    Basic metabolic panel; Future    amLODIPine (NORVASC) 5 mg tablet; Take 1 tablet (5 mg total) by mouth daily

## 2025-02-18 NOTE — ASSESSMENT & PLAN NOTE
Continue statin.     Orders:    Comprehensive metabolic panel; Future    Lipid Panel with Direct LDL reflex; Future    atorvastatin (LIPITOR) 20 mg tablet; Take 1 tablet (20 mg total) by mouth daily

## 2025-02-18 NOTE — PATIENT INSTRUCTIONS
Patient Education     Ischiogluteal Bursitis Exercises   About this topic   A bursa is a small fluid-filled sac that sits near a bone. It cushions and protects nearby tissues when they rub on or slide over bones. These sacs, called bursae, are found near many parts of the body including the hip. Bursitis happens when a bursa gets irritated and swollen. Ischiogluteal bursitis happens when the bursa near the bony area between the buttocks and thigh is swollen. Exercises can help make this problem better.   General   Before starting with a program, ask your doctor if you are healthy enough to do these exercises. Your doctor may have you work with a chiropractor,  or physical therapist to make a safe exercise program to meet your needs.  Stretching Exercises   Stretching exercises keep your muscles flexible. They also stop them from getting tight. Start by doing each of these stretches 2 to 3 times. In order for your body to make changes, you will need to hold these stretches for 20 to 30 seconds. Try to do the stretches 2 to 3 times each day. Do all exercises slowly.  Single knee to chest stretches ? Lie on your back. Pull one knee towards your chest until you feel a stretch in your lower back and buttock area. Repeat with the other knee. If you have knee problems, pull your knee up by grabbing the back of your thigh instead of the front of your knee. You can also do this exercise by grabbing both knees at the same time.  Deep hip stretches lying down ? Lie on your back and bend one knee, keeping that foot flat on the floor. Cross the other leg over your knee. Pull the bottom leg towards your chest until you feel a stretch in the other buttock. Repeat using the opposite leg as the bottom leg.  Hamstring stretches standing ? Stand with your feet shoulder width apart. Straighten one leg about a foot in front of the other. Keeping your front leg straight, bend the knee on your back leg. At the same time, keep your  back straight and lean forward at the waist. You should feel a stretch on the back of your thigh. Pull the toes up on your front foot. Repeat on the other side.  Strengthening Exercises   Strengthening exercises keep your muscles firm and strong. Start by repeating each exercise 2 to 3 times. Work up to doing each exercise 10 times. Try to do the exercises 2 to 3 times each day. Do all exercises slowly.  Hip lifts ? Lie on your back with your knees bent and feet flat on the floor. Tighten your stomach muscles and lift your buttocks off the floor. Hold 3 to 5 seconds. Relax.  Leg lifts on stomach ? Lie on your stomach. Keeping the knee straight, lift one leg towards the ceiling. Hold for 3 to 5 seconds. Then, lower back to the ground. Repeat with the other leg.  Kick backs on all fours ? Get in an all fours position. Lift one leg up and kick it straight out until it is in line with your back. Hold 3 to 5 seconds. Lower it back down. Now, lift up the other leg and hold 3 to 5 seconds. Alternate legs.               What will the results be?   Less pain, especially when sitting  Less swelling  Better range of motion  More strength  Faster return to activities  Easier to walk and do other activities  Helpful tips   Stay active and work out to keep your muscles strong and flexible.  Keep a healthy weight so there is not extra stress on your joints. Eat a healthy diet to keep your muscles healthy.  Be sure you do not hold your breath when exercising. This can raise your blood pressure. If you tend to hold your breath, try counting out loud when exercising. If any exercise bothers you, stop right away.  Always warm up before stretching. Heated muscles stretch much easier than cool muscles. Stretching cool muscles can lead to injury.  Try walking or cycling at an easy pace for a few minutes to warm up your muscles. Do this again after exercising.  Never bounce when doing stretches.  After exercising, it is a good idea to use  ice. Place an ice pack or a bag of frozen peas wrapped in a towel over the painful part. Never put ice right on the skin. Do not leave the ice on more than 10 to 15 minutes at a time. Ice after activity may help decrease pain and swelling. Never ice before stretching.  Doing exercises before a meal may be a good way to get into a routine.  Exercise may be slightly uncomfortable, but you should not have sharp pains. If you do get sharp pains, stop what you are doing. If the sharp pains continue, call your doctor.  Last Reviewed Date   2021-08-30  Consumer Information Use and Disclaimer   This generalized information is a limited summary of diagnosis, treatment, and/or medication information. It is not meant to be comprehensive and should be used as a tool to help the user understand and/or assess potential diagnostic and treatment options. It does NOT include all information about conditions, treatments, medications, side effects, or risks that may apply to a specific patient. It is not intended to be medical advice or a substitute for the medical advice, diagnosis, or treatment of a health care provider based on the health care provider's examination and assessment of a patient’s specific and unique circumstances. Patients must speak with a health care provider for complete information about their health, medical questions, and treatment options, including any risks or benefits regarding use of medications. This information does not endorse any treatments or medications as safe, effective, or approved for treating a specific patient. Up[employer], Inc[employer] and its affiliates disclaim any warranty or liability relating to this information or the use thereof. The use of this information is governed by the Terms of Use, available at https://www.woltersInnolightuwer.com/en/know/clinical-effectiveness-terms   Copyright   Copyright © 2024 UpToDate, Inc[employer] and its affiliates and/or licensors. All rights reserved.

## 2025-02-18 NOTE — PROGRESS NOTES
Adult Annual Physical  Name: Wilder Pacheco      : 1970      MRN: [id number]  Encounter Provider: LIAM Prince  Encounter Date: 2025   Encounter department: [facility]    Assessment & Plan  Essential hypertension  Stable.  On amlodipine.     Orders:    Basic metabolic panel; Future    amLODIPine (NORVASC) 5 mg tablet; Take 1 tablet (5 mg total) by mouth daily    FATOUMATA (obstructive sleep apnea)  Compliant with CPAP.          Mixed hyperlipidemia  Continue statin.     Orders:    Comprehensive metabolic panel; Future    Lipid Panel with Direct LDL reflex; Future    atorvastatin (LIPITOR) 20 mg tablet; Take 1 tablet (20 mg total) by mouth daily    Screening for colon cancer    Orders:    Ambulatory Referral to Colorectal Surgery; Future    Left hip pain  Provided home exercises to do at home.   Declines PT at this time.  If pain persists, provided a referral to orthopedics.   Orders:    Ambulatory Referral to Orthopedic Surgery; Future    Immunizations and preventive care screenings were discussed with patient today. Appropriate education was printed on patient's after visit summary.         History of Present Illness     Adult Annual Physical:  Patient presents for annual physical. Started on phentermine about 3 weeks ago. His appetite is reduced. He does have some intermittent lightheadedness.   He was on ozempic but didn't find it effective.   He is down 18 lbs.     He has pain in his left buttocks for the last 2 weeks, worse after bowling. .     Diet and Physical Activity:  - Diet/Nutrition: well balanced diet.  - Exercise: 1-2 times a week on average.    Depression Screening:  - PHQ-2 Score: 1    General Health:  - Sleep: sleeps well.  - Hearing: normal hearing right ear and normal hearing left ear.  - Vision: goes for regular eye exams.  - Dental: regular dental visits.     Health:    - Urinary symptoms: none.     Review of Systems   Constitutional:  Negative  "for activity change, appetite change, fatigue and unexpected weight change.   Eyes:  Negative for visual disturbance.   Respiratory:  Negative for cough and shortness of breath.    Cardiovascular:  Negative for chest pain, palpitations and leg swelling.   Gastrointestinal:  Negative for abdominal pain, blood in stool, constipation and diarrhea.   Genitourinary:  Negative for difficulty urinating.   Musculoskeletal:  Positive for arthralgias.   Skin:  Negative for rash.   Neurological:  Negative for dizziness, weakness, light-headedness and headaches.   Psychiatric/Behavioral:  Negative for sleep disturbance.          Objective   /76   Pulse 85   Temp (!) 97 °F (36.1 °C)   Ht 5' 8\" (1.727 m)   Wt 106 kg (233 lb)   SpO2 100%   BMI 35.43 kg/m²     Physical Exam  Vitals reviewed.   Constitutional:       Appearance: Normal appearance. He is well-developed.   HENT:      Head: Normocephalic and atraumatic.      Right Ear: Tympanic membrane, ear canal and external ear normal.      Left Ear: Tympanic membrane, ear canal and external ear normal.   Eyes:      Conjunctiva/sclera: Conjunctivae normal.   Cardiovascular:      Rate and Rhythm: Normal rate and regular rhythm.      Heart sounds: Normal heart sounds.   Pulmonary:      Effort: Pulmonary effort is normal.      Breath sounds: Normal breath sounds.   Abdominal:      General: Bowel sounds are normal.      Palpations: Abdomen is soft.   Musculoskeletal:         General: Normal range of motion.      Cervical back: Neck supple.      Right lower leg: No edema.      Left lower leg: No edema.   Skin:     General: Skin is warm and dry.   Neurological:      Mental Status: He is alert and oriented to person, place, and time.   Psychiatric:         Mood and Affect: Mood normal.         Behavior: Behavior normal.         "

## 2025-02-28 ENCOUNTER — OFFICE VISIT (OUTPATIENT)
Dept: OBGYN CLINIC | Facility: CLINIC | Age: 55
End: 2025-02-28
Payer: COMMERCIAL

## 2025-02-28 ENCOUNTER — HOSPITAL ENCOUNTER (OUTPATIENT)
Dept: RADIOLOGY | Facility: HOSPITAL | Age: 55
End: 2025-02-28
Attending: STUDENT IN AN ORGANIZED HEALTH CARE EDUCATION/TRAINING PROGRAM
Payer: COMMERCIAL

## 2025-02-28 VITALS — BODY MASS INDEX: 34.65 KG/M2 | WEIGHT: 228.6 LBS | HEIGHT: 68 IN

## 2025-02-28 DIAGNOSIS — M70.62 GREATER TROCHANTERIC BURSITIS OF LEFT HIP: Primary | ICD-10-CM

## 2025-02-28 DIAGNOSIS — M25.552 LEFT HIP PAIN: ICD-10-CM

## 2025-02-28 DIAGNOSIS — M76.892 HIP ABDUCTOR TENDONITIS, LEFT: ICD-10-CM

## 2025-02-28 PROCEDURE — 20610 DRAIN/INJ JOINT/BURSA W/O US: CPT | Performed by: STUDENT IN AN ORGANIZED HEALTH CARE EDUCATION/TRAINING PROGRAM

## 2025-02-28 PROCEDURE — 99214 OFFICE O/P EST MOD 30 MIN: CPT | Performed by: STUDENT IN AN ORGANIZED HEALTH CARE EDUCATION/TRAINING PROGRAM

## 2025-02-28 PROCEDURE — 73502 X-RAY EXAM HIP UNI 2-3 VIEWS: CPT

## 2025-02-28 RX ORDER — TRIAMCINOLONE ACETONIDE 40 MG/ML
40 INJECTION, SUSPENSION INTRA-ARTICULAR; INTRAMUSCULAR
Status: COMPLETED | OUTPATIENT
Start: 2025-02-28 | End: 2025-02-28

## 2025-02-28 RX ORDER — MELOXICAM 15 MG/1
15 TABLET ORAL DAILY
Qty: 30 TABLET | Refills: 2 | Status: SHIPPED | OUTPATIENT
Start: 2025-02-28 | End: 2025-05-29

## 2025-02-28 RX ORDER — BUPIVACAINE HYDROCHLORIDE 2.5 MG/ML
2 INJECTION, SOLUTION INFILTRATION; PERINEURAL
Status: COMPLETED | OUTPATIENT
Start: 2025-02-28 | End: 2025-02-28

## 2025-02-28 RX ORDER — LIDOCAINE HYDROCHLORIDE 10 MG/ML
2 INJECTION, SOLUTION INFILTRATION; PERINEURAL
Status: COMPLETED | OUTPATIENT
Start: 2025-02-28 | End: 2025-02-28

## 2025-02-28 RX ADMIN — BUPIVACAINE HYDROCHLORIDE 2 ML: 2.5 INJECTION, SOLUTION INFILTRATION; PERINEURAL at 14:00

## 2025-02-28 RX ADMIN — TRIAMCINOLONE ACETONIDE 40 MG: 40 INJECTION, SUSPENSION INTRA-ARTICULAR; INTRAMUSCULAR at 14:00

## 2025-02-28 RX ADMIN — LIDOCAINE HYDROCHLORIDE 2 ML: 10 INJECTION, SOLUTION INFILTRATION; PERINEURAL at 14:00

## 2025-02-28 NOTE — PROGRESS NOTES
Ortho Sports Medicine New Patient Visit     Assesment:   54 y.o. male with greater trochanteric bursitis of the left hip and abductor tendinitis of the left hip    Plan:  Discussed physical exam and imaging findings of the left hip at length with patient in the office today.  Discussed findings significant for greater trochanteric bursitis of the left hip and abductor tendinitis of the left hip.  Secondary to these findings recommend conservative treatment with rest, activity modification, and oral anti-inflammatories as needed for persistent pain and inflammation.  Also discussed physical therapy for strengthening of the hip and core muscles as well as stretching of the proximal hamstring and abductor tendons.  Patient expressed understanding and referral placed today.  Also discussed meloxicam to be taken daily for persistent pain and inflammation.  Discussed side effects and risks of medication.  Patient expressed understanding and order placed today.  Discussed steroid injection of the greater troch bursa in the office today.  Patient agreeable.  In a sterile fashion, an injection of 2 cc of lidocaine, 2 cc of bupivacaine, and 2 cc of Depo was administered into the greater trochanteric bursa of the left hip.  Patient tolerated procedure well without complication.  Patient agreeable to the above treatments.  He will follow-up in 6 weeks for reevaluation of current issue.  All questions were answered in the office today.    1. Greater trochanteric bursitis of left hip  -     Ambulatory Referral to Orthopedic Surgery  -     XR hip/pelv 2-3 vws left if performed; Future; Expected date: 02/28/2025  -     Ambulatory Referral to Physical Therapy; Future  -     meloxicam (Mobic) 15 mg tablet; Take 1 tablet (15 mg total) by mouth daily  -     Large joint arthrocentesis: L greater trochanteric bursa  -     bupivacaine (MARCAINE) 0.25 % injection 2 mL  -     lidocaine (XYLOCAINE) 1 % injection 2 mL  -     triamcinolone  acetonide (Kenalog-40) 40 mg/mL injection 40 mg  2. Hip abductor tendonitis, left  -     Ambulatory Referral to Physical Therapy; Future  -     meloxicam (Mobic) 15 mg tablet; Take 1 tablet (15 mg total) by mouth daily        Follow up:    Return in about 6 weeks (around 4/11/2025).        Chief Complaint   Patient presents with    Left Hip - Pain       History of Present Illness:    The patient is a 54 y.o. male who presents for evaluation of the left hip in the office today.  Patient notes into the lateral left thigh and proximal hamstring for about a month.  Patient states he has recently started a new gym routine he is also an avid [place].  Patient notes recent inability to bowl secondary to persistent proximal hamstring and lateral left thigh pain with his bowling stride.  Patient notes inability to participate in recent events secondary to pain.  Patient has tried activity modification as well as stretching and oral anti-inflammatories as needed with mild relief.  He denies previous surgery, injury, or injections of the left hip.  He denies numbness and tingling of the left lower extremity.  He works at [employer] and is able to work from home now.      Hip Surgical History:  None    Past Medical, Social and Family History:  Past Medical History:   Diagnosis Date    Allergic     Obesity     Patient denies medical problems     Pneumonia      Past Surgical History:   Procedure Laterality Date    ACHILLES TENDON REPAIR      FINGER SURGERY      right hand figth digit    FRACTURE SURGERY      HEEL SPUR SURGERY      KNEE SURGERY      VASECTOMY  01/08/2024     No Known Allergies  Current Outpatient Medications on File Prior to Visit   Medication Sig Dispense Refill    amLODIPine (NORVASC) 5 mg tablet Take 1 tablet (5 mg total) by mouth daily 90 tablet 1    atorvastatin (LIPITOR) 20 mg tablet Take 1 tablet (20 mg total) by mouth daily 90 tablet 1    phentermine 30 MG capsule Take 1 capsule (30 mg total) by mouth  every morning 90 capsule 1    sildenafil (VIAGRA) 50 MG tablet Take 1 tablet (50 mg total) by mouth daily as needed for erectile dysfunction 10 tablet 4    topiramate (TOPAMAX) 50 MG tablet Take 1 tablet (50 mg total) by mouth every 12 (twelve) hours 180 tablet 1     No current facility-administered medications on file prior to visit.     Social History     Socioeconomic History    Marital status:      Spouse name: Not on file    Number of children: Not on file    Years of education: Not on file    Highest education level: Not on file   Occupational History    Not on file   Tobacco Use    Smoking status: Never    Smokeless tobacco: Never   Vaping Use    Vaping status: Never Used   Substance and Sexual Activity    Alcohol use: Yes     Alcohol/week: 9.0 standard drinks of alcohol     Types: 3 Shots of liquor, 6 Standard drinks or equivalent per week     Comment: soc    Drug use: Never    Sexual activity: Yes     Partners: Female     Birth control/protection: Condom Male, Rhythm   Other Topics Concern    Not on file   Social History Narrative    Not on file     Social Drivers of Health     Financial Resource Strain: Not on file   Food Insecurity: Not on file   Transportation Needs: Not on file   Physical Activity: Not on file   Stress: Not on file   Social Connections: Not on file   Intimate Partner Violence: Not on file   Housing Stability: Not on file         I have reviewed the past medical, surgical, social and family history, medications and allergies as documented in the EMR.    Review of systems: ROS is negative other than that noted in the HPI.  Constitutional: Negative for fatigue and fever.   HENT: Negative for sore throat.    Respiratory: Negative for shortness of breath.    Cardiovascular: Negative for chest pain.   Gastrointestinal: Negative for abdominal pain.   Endocrine: Negative for cold intolerance and heat intolerance.   Genitourinary: Negative for flank pain.   Musculoskeletal: Negative for  "back pain.   Skin: Negative for rash.   Allergic/Immunologic: Negative for immunocompromised state.   Neurological: Negative for dizziness.   Psychiatric/Behavioral: Negative for agitation.      Physical Exam:    Height 5' 8\" (1.727 m), weight 104 kg (228 lb 9.6 oz).    General/Constitutional: NAD, well developed, well nourished  HENT: Normocephalic, atraumatic  CV: Intact distal pulses, regular rate  Resp: No respiratory distress or labored breathing  Lymphatic: No lymphadenopathy palpated  Neuro: Alert and Oriented x 3, no focal deficits  Psych: Normal mood, normal affect  Skin: Warm, dry, no rashes, no erythema      Hip Exam:   No skin lesions or significant deformity   Palpation: Tenderness to palpation over the greater trochanteric bursa  ROM : 120 of flexion, 35 of IR, 40 of ER  Negative straight leg raise   FADIR: Negative  DEWAYNE: Negative   Negative Stinchfield   Pain with resisted hip abduction  Neurovasculary intact distally         Hip Imaging    I personally reviewed and interpreted 3 x-rays including AP pelvis, as well as AP and modified [place] lateral of the affected hip which were obtained in the office today and reviewed in detail with the patient. There are mild degenerative changes in the hip joint. No acute fracture or dislocation. No other bony pathology is present. There is not a cam deformity. There is not a pincer deformity.      Large joint arthrocentesis: L greater trochanteric bursa  Universal Protocol:  Consent: Verbal consent obtained.  Consent given by: patient  Patient understanding: patient states understanding of the procedure being performed  Patient identity confirmed: verbally with patient  Supporting Documentation  Indications: pain   Procedure Details  Location: hip - L greater trochanteric bursa  Needle size: 22 G  Ultrasound guidance: no  Approach: lateral  Medications administered: 2 mL bupivacaine 0.25 %; 2 mL lidocaine 1 %; 40 mg triamcinolone acetonide 40 mg/mL    Patient " tolerance: patient tolerated the procedure well with no immediate complications  Dressing:  Sterile dressing applied            Scribe Attestation      I,:  Deborah Santos PA-C am acting as a scribe while in the presence of the attending physician.:       I,:  Alfa Damon MD personally performed the services described in this documentation    as scribed in my presence.:

## 2025-03-04 ENCOUNTER — EVALUATION (OUTPATIENT)
Dept: PHYSICAL THERAPY | Facility: REHABILITATION | Age: 55
End: 2025-03-04
Payer: COMMERCIAL

## 2025-03-04 DIAGNOSIS — M70.62 GREATER TROCHANTERIC BURSITIS OF LEFT HIP: ICD-10-CM

## 2025-03-04 DIAGNOSIS — M76.892 HIP ABDUCTOR TENDONITIS, LEFT: ICD-10-CM

## 2025-03-04 PROCEDURE — 97110 THERAPEUTIC EXERCISES: CPT | Performed by: PHYSICAL THERAPIST

## 2025-03-04 PROCEDURE — 97161 PT EVAL LOW COMPLEX 20 MIN: CPT | Performed by: PHYSICAL THERAPIST

## 2025-03-04 NOTE — PROGRESS NOTES
PT Evaluation  and Discharge    Today's date: 3/4/2025  Patient name: Wilder Pacheco  : 1970  MRN: [id number]  Referring provider: Deborah Santos PA-C  Dx:   Encounter Diagnosis     ICD-10-CM    1. Greater trochanteric bursitis of left hip  M70.62 Ambulatory Referral to Physical Therapy      2. Hip abductor tendonitis, left  M76.892 Ambulatory Referral to Physical Therapy                     Assessment    Assessment details: Patient referred to PT for initial evaluation for diagnoses of left hip greater trochanteric bursitis and left hip abductor tendinitis.  Patient demonstrates only mild pain in his hip after receiving injection, and demonstrates no significant objective deficits with the exception of mild restriction of the ITB and Hamstring and moderate restriction of the Piriformis.  Patient was educated in HEP activities to address muscle flexibility restrictions.  Patient states he wishes to perform exercises at home at this time secondary to very high insurance co-payment.  Patient will be discharged from PT at this time with recommendation to perform HEP activities.     Plan    Planned therapy interventions: home exercise program    Plan details: D/C to HEP at this time        Subjective Evaluation    History of Present Illness  Mechanism of injury: Patient refers to PT with c/o pain in his left lateral hip and buttocks area which began approximately one month previous of insidious onset.  Patient received X-rays of his left hip on 25 which revealed mild osteoarthritis, no acute fracture or dislocation.  Patient states decreased pain since pain since receiving injection on 25.  Patient denies numbness and tingling in his left LE.  Patient denies instability of his left LE.  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  Patient goal to return to bowling without pain.   Pain  Current pain ratin  At best pain ratin  At worst pain rating: 3          Objective     Active  Range of Motion   Left Hip   Normal active range of motion    Passive Range of Motion   Left Hip   Normal passive range of motion    Strength/Myotome Testing     Left Hip   Planes of Motion   Flexion: 5  Extension: 5  Abduction: 5  Adduction: 5    Left Knee   Flexion: 5  Extension: 5    Right Knee   Flexion: 5  Extension: 5    Left Ankle/Foot   Dorsiflexion: 5  Plantar flexion: 5    Right Ankle/Foot   Dorsiflexion: 5  Plantar flexion: 5    General Comments:      Hip Comments   Mild restriction of left Hamstring and ITB  Moderate restriction of left Piriformis              Precautions: HTN, Sleep apnea      Manuals 3/4                                                                Neuro Re-Ed                                                                                                        Ther Ex             Standing ITB stretch HEP            Supine Piriformis stretch HEP            Supine Hamstring stretch w/strap HEP                                                                             Ther Activity                                       Gait Training                                       Modalities

## 2025-03-06 ENCOUNTER — APPOINTMENT (OUTPATIENT)
Dept: PHYSICAL THERAPY | Facility: REHABILITATION | Age: 55
End: 2025-03-06
Payer: COMMERCIAL

## 2025-03-19 ENCOUNTER — APPOINTMENT (OUTPATIENT)
Dept: LAB | Facility: CLINIC | Age: 55
End: 2025-03-19
Payer: COMMERCIAL

## 2025-03-19 DIAGNOSIS — I10 ESSENTIAL HYPERTENSION: ICD-10-CM

## 2025-03-19 LAB
ANION GAP SERPL CALCULATED.3IONS-SCNC: 6 MMOL/L (ref 4–13)
BUN SERPL-MCNC: 23 MG/DL (ref 5–25)
CALCIUM SERPL-MCNC: 9.5 MG/DL (ref 8.4–10.2)
CHLORIDE SERPL-SCNC: 109 MMOL/L (ref 96–108)
CO2 SERPL-SCNC: 24 MMOL/L (ref 21–32)
CREAT SERPL-MCNC: 0.9 MG/DL (ref 0.6–1.3)
GFR SERPL CREATININE-BSD FRML MDRD: 96 ML/MIN/1.73SQ M
GLUCOSE P FAST SERPL-MCNC: 88 MG/DL (ref 65–99)
POTASSIUM SERPL-SCNC: 4.1 MMOL/L (ref 3.5–5.3)
SODIUM SERPL-SCNC: 139 MMOL/L (ref 135–147)

## 2025-03-19 PROCEDURE — 80048 BASIC METABOLIC PNL TOTAL CA: CPT

## 2025-03-19 PROCEDURE — 36415 COLL VENOUS BLD VENIPUNCTURE: CPT

## 2025-03-20 ENCOUNTER — RESULTS FOLLOW-UP (OUTPATIENT)
Dept: INTERNAL MEDICINE CLINIC | Facility: CLINIC | Age: 55
End: 2025-03-20

## 2025-05-01 DIAGNOSIS — E66.812 CLASS 2 OBESITY DUE TO EXCESS CALORIES WITHOUT SERIOUS COMORBIDITY WITH BODY MASS INDEX (BMI) OF 36.0 TO 36.9 IN ADULT: ICD-10-CM

## 2025-05-01 DIAGNOSIS — E66.09 CLASS 2 OBESITY DUE TO EXCESS CALORIES WITHOUT SERIOUS COMORBIDITY WITH BODY MASS INDEX (BMI) OF 36.0 TO 36.9 IN ADULT: ICD-10-CM

## 2025-05-01 RX ORDER — TOPIRAMATE 50 MG/1
50 TABLET, FILM COATED ORAL EVERY 12 HOURS
Qty: 180 TABLET | Refills: 1 | Status: SHIPPED | OUTPATIENT
Start: 2025-05-01

## 2025-05-01 RX ORDER — PHENTERMINE HYDROCHLORIDE 30 MG/1
30 CAPSULE ORAL EVERY MORNING
Qty: 90 CAPSULE | Refills: 0 | Status: SHIPPED | OUTPATIENT
Start: 2025-05-01

## 2025-05-01 NOTE — TELEPHONE ENCOUNTER
pt calling he is leaving tues morning to visit his mom in [place] for a week. pt on weight loss meds pt running low. pt asked for a refill in advance to make sure he has it before leaving to [place].   Pt has appt on 5/5/25

## 2025-05-05 ENCOUNTER — OFFICE VISIT (OUTPATIENT)
Dept: ENDOCRINOLOGY | Facility: CLINIC | Age: 55
End: 2025-05-05
Payer: COMMERCIAL

## 2025-05-05 ENCOUNTER — TELEPHONE (OUTPATIENT)
Dept: ENDOCRINOLOGY | Facility: CLINIC | Age: 55
End: 2025-05-05

## 2025-05-05 ENCOUNTER — TELEPHONE (OUTPATIENT)
Age: 55
End: 2025-05-05

## 2025-05-05 VITALS
OXYGEN SATURATION: 98 % | TEMPERATURE: 97.9 F | HEIGHT: 68 IN | WEIGHT: 211 LBS | HEART RATE: 94 BPM | DIASTOLIC BLOOD PRESSURE: 76 MMHG | RESPIRATION RATE: 14 BRPM | BODY MASS INDEX: 31.98 KG/M2 | SYSTOLIC BLOOD PRESSURE: 124 MMHG

## 2025-05-05 DIAGNOSIS — N52.8 OTHER MALE ERECTILE DYSFUNCTION: ICD-10-CM

## 2025-05-05 DIAGNOSIS — E66.812 CLASS 2 OBESITY DUE TO EXCESS CALORIES WITHOUT SERIOUS COMORBIDITY WITH BODY MASS INDEX (BMI) OF 36.0 TO 36.9 IN ADULT: Primary | ICD-10-CM

## 2025-05-05 DIAGNOSIS — R63.4 WEIGHT LOSS: ICD-10-CM

## 2025-05-05 DIAGNOSIS — E55.9 VITAMIN D DEFICIENCY: ICD-10-CM

## 2025-05-05 DIAGNOSIS — N62 GYNECOMASTIA, MALE: ICD-10-CM

## 2025-05-05 DIAGNOSIS — E66.09 CLASS 2 OBESITY DUE TO EXCESS CALORIES WITHOUT SERIOUS COMORBIDITY WITH BODY MASS INDEX (BMI) OF 36.0 TO 36.9 IN ADULT: Primary | ICD-10-CM

## 2025-05-05 DIAGNOSIS — E78.2 MIXED HYPERLIPIDEMIA: ICD-10-CM

## 2025-05-05 PROCEDURE — 99214 OFFICE O/P EST MOD 30 MIN: CPT | Performed by: INTERNAL MEDICINE

## 2025-05-05 RX ORDER — TOPIRAMATE 50 MG/1
50 TABLET, FILM COATED ORAL EVERY 12 HOURS
Qty: 180 TABLET | Refills: 1 | Status: SHIPPED | OUTPATIENT
Start: 2025-05-05

## 2025-05-05 RX ORDER — PHENTERMINE HYDROCHLORIDE 30 MG/1
30 CAPSULE ORAL EVERY MORNING
Qty: 90 CAPSULE | Refills: 1 | Status: SHIPPED | OUTPATIENT
Start: 2025-05-05

## 2025-05-05 NOTE — TELEPHONE ENCOUNTER
PA for  (WEGOVY) 0.25 MG[facility]ML[facility]     via      [x][employer]-Case ID #       [x]PA sent as URGENT    All office notes, labs and other pertaining documents and studies sent. Clinical questions answered. Awaiting determination from insurance company.     Turnaround time for your insurance to make a decision on your Prior Authorization can take 7-21 business days.

## 2025-05-05 NOTE — TELEPHONE ENCOUNTER
PA for phentermine 30 MGSUBMITTED to [facility]     via      [x][employer]-Case ID #    [id number]  [x]PA sent as URGENT    All office notes, labs and other pertaining documents and studies sent. Clinical questions answered. Awaiting determination from insurance company.     Turnaround time for your insurance to make a decision on your Prior Authorization can take 7-21 business days.

## 2025-05-05 NOTE — ASSESSMENT & PLAN NOTE
He seems to have a great response to combined lifestyle changes and Phentermine/Topamax but still needs another 15% body weight loss.  He will benefit from adding a weight loss agent and we agreed to start Wegovy with gaol weight of 180 lbs.  Reiterated diet and lifestyle interventions.  Suggested HIIT training that should help us achieve long term  weight and metabolic health management and also hopefully eliminate all meds.    Follow up in 3 months.

## 2025-05-05 NOTE — PROGRESS NOTES
"    Follow-up Patient Progress Note      CC: Gynecomastia, Weight gain     History of Present Illness:   53 yr male with HLD, obesity BMI 36, gynecomastia and weakness. Last visit was 11/4/24.     Since last visit, he lost 33 lbs.    Max adult weight 275 lbs age 40; minimum adult weight 190 lbs age 22yrs.     He reports occasional ED but normal libido. Sexually active and no testicular injury in past.    Physical Exam:  Body mass index is 32.08 kg/m².  /76 (BP Location: Left arm, Patient Position: Sitting)   Pulse 94   Temp 97.9 °F (36.6 °C) (Tympanic)   Resp 14   Ht 5' 8\" (1.727 m)   Wt 95.7 kg (211 lb)   SpO2 98%   BMI 32.08 kg/m²    Vitals:    05/05/25 1256   Weight: 95.7 kg (211 lb)        Physical Exam  Constitutional:       General: He is not in acute distress.     Appearance: He is well-developed.   HENT:      Head: Normocephalic and atraumatic.      Nose: Nose normal.   Eyes:      Conjunctiva/sclera: Conjunctivae normal.   Pulmonary:      Effort: Pulmonary effort is normal.   Abdominal:      General: There is no distension.   Musculoskeletal:      Cervical back: Normal range of motion and neck supple.   Skin:     Findings: No rash.      Comments: No icterus   Neurological:      Mental Status: He is alert and oriented to person, place, and time.         Labs:   No results found for: \"HGBA1C\"    Lab Results   Component Value Date    MKF7GZYHJIAK 1.945 02/15/2025       Lab Results   Component Value Date    CREATININE 0.90 03/19/2025    CREATININE 0.98 02/15/2025    CREATININE 0.68 04/30/2024    BUN 23 03/19/2025    K 4.1 03/19/2025     (H) 03/19/2025    CO2 24 03/19/2025     eGFR   Date Value Ref Range Status   03/19/2025 96 ml/min/1.73sq m Final       Lab Results   Component Value Date    ALT 20 02/15/2025    AST 15 02/15/2025    ALKPHOS 94 02/15/2025       Lab Results   Component Value Date    CHOLESTEROL 111 02/15/2025    CHOLESTEROL 256 (H) 04/30/2024    CHOLESTEROL 262 (H) 12/06/2023 " "    Lab Results   Component Value Date    HDL 45 02/15/2025    HDL 76 04/30/2024    HDL 85 12/06/2023     Lab Results   Component Value Date    TRIG 63 02/15/2025    TRIG 92 04/30/2024    TRIG 112 12/06/2023     No results found for: \"NONHDLC\"      Assessment/Plan:    1. Class 2 obesity due to excess calories without serious comorbidity with body mass index (BMI) of 36.0 to 36.9 in adult  Assessment & Plan:  He seems to have a great response to combined lifestyle changes and Phentermine/Topamax but still needs another 15% body weight loss.  He will benefit from adding a weight loss agent and we agreed to start Wegovy with gaol weight of 180 lbs.  Reiterated diet and lifestyle interventions.  Suggested HIIT training that should help us achieve long term  weight and metabolic health management and also hopefully eliminate all meds.    Follow up in 3 months.  Orders:  -     phentermine 30 MG capsule; Take 1 capsule (30 mg total) by mouth every morning  -     topiramate (TOPAMAX) 50 MG tablet; Take 1 tablet (50 mg total) by mouth every 12 (twelve) hours  -     Semaglutide-Weight Management (WEGOVY) 0.25 MG/0.5ML; Inject 0.5 mL (0.25 mg total) under the skin once a week Use 0.25mg weekly for 4 weeks then 0.5mg weekly  -     Comprehensive metabolic panel; Future  -     CBC and differential; Future  2. Mixed hyperlipidemia  3. Other male erectile dysfunction  -     Testosterone, free, total; Future  -     Estradiol; Future  4. Gynecomastia, male  Assessment & Plan:  Hopefully will improve with weight loss.  5. Vitamin D deficiency  -     Vitamin D 25 hydroxy; Future  6. Weight loss  -     T4, free; Future  -     TSH, 3rd generation; Future        I have spent a total time of  minutes on 05/05/25 in caring for this patient including greater than 50% of this time was spent in counseling/coordination of care as listed above.       Discussed with the patient and all questioned fully answered. He will contact me with " concerns.    Lisa Pino

## 2025-05-12 ENCOUNTER — TELEPHONE (OUTPATIENT)
Age: 55
End: 2025-05-12

## 2025-05-12 NOTE — TELEPHONE ENCOUNTER
Scheduled date of colonoscopy (as of today): Wednesday 6/4/23025  Physician performing colonoscopy:Dr Mercado  Location of colonoscopy: [facility]  Bowel prep reviewed with patient:MARY VILLALTA  Instructions reviewed with patient by:FW - Via email  Clearances: N/A    Email address used: [email or web address]

## 2025-05-12 NOTE — TELEPHONE ENCOUNTER
PA for Phentermine 30mg DENIED    Reason:        Message sent to office clinical pool Yes    Denial letter scanned into Media Yes    We can gladly do an appeal but the process can take about 30-60 days to provide determination. Please have the office staff schedule a Peer to Peer at phone [phone]  . If an appeal is truly warranted please have Provider send clinical documentation to the PA department to support the appeal.     **Please follow up with your patient regarding denial and next steps**

## 2025-05-12 NOTE — TELEPHONE ENCOUNTER
PA for Wegovy 0.25mg DENIED    Reason:    Patient cannot be on both Wegovy and Phentermine    Message sent to office clinical pool Yes    Denial letter scanned into Media Yes    We can gladly do an appeal but the process can take about 30-60 days to provide determination. Please have the office staff schedule a Peer to Peer at phone [phone] . If an appeal is truly warranted please have Provider send clinical documentation to the PA department to support the appeal.     **Please follow up with your patient regarding denial and next steps**

## 2025-05-12 NOTE — TELEPHONE ENCOUNTER
05/12/25  Screened by: Felisa Monteiro    Referring Provider PCP    Pre- Screening:     There is no height or weight on file to calculate BMI. BMI- 32.08  Has patient been referred for a routine screening Colonoscopy? yes  Is the patient between 45-75 years old? yes      Previous Colonoscopy yes   If yes:    Date: 5Y AGO    Facility: [facility]    Reason: SCREENING      Does the patient want to see a Gastroenterologist prior to their procedure OR are they having any GI symptoms? no    Has the patient been hospitalized or had abdominal surgery in the past 6 months? no    Does the patient use supplemental oxygen? no    Does the patient take Coumadin, Lovenox, Plavix, Elliquis, Xarelto, or other blood thinning medication? no    Has the patient had a stroke, cardiac event, or stent placed in the past year? no      If patient is between 45yrs - 49yrs, please advise patient that we will have to confirm benefits & coverage with their insurance company for a routine screening colonoscopy.

## 2025-05-13 DIAGNOSIS — E66.09 CLASS 2 OBESITY DUE TO EXCESS CALORIES WITHOUT SERIOUS COMORBIDITY WITH BODY MASS INDEX (BMI) OF 36.0 TO 36.9 IN ADULT: ICD-10-CM

## 2025-05-13 DIAGNOSIS — E66.812 CLASS 2 OBESITY DUE TO EXCESS CALORIES WITHOUT SERIOUS COMORBIDITY WITH BODY MASS INDEX (BMI) OF 36.0 TO 36.9 IN ADULT: ICD-10-CM

## 2025-05-13 NOTE — TELEPHONE ENCOUNTER
My chart message sent to patient to call office to review message from provider regarding Topamax and Phentermine.

## 2025-05-31 DIAGNOSIS — M70.62 GREATER TROCHANTERIC BURSITIS OF LEFT HIP: ICD-10-CM

## 2025-05-31 DIAGNOSIS — M76.892 HIP ABDUCTOR TENDONITIS, LEFT: ICD-10-CM

## 2025-06-02 RX ORDER — MELOXICAM 15 MG/1
15 TABLET ORAL DAILY
Qty: 30 TABLET | Refills: 2 | Status: SHIPPED | OUTPATIENT
Start: 2025-06-02

## 2025-06-04 ENCOUNTER — ANESTHESIA (OUTPATIENT)
Dept: GASTROENTEROLOGY | Facility: HOSPITAL | Age: 55
End: 2025-06-04
Payer: COMMERCIAL

## 2025-06-04 ENCOUNTER — HOSPITAL ENCOUNTER (OUTPATIENT)
Dept: GASTROENTEROLOGY | Facility: HOSPITAL | Age: 55
Setting detail: OUTPATIENT SURGERY
Discharge: HOME | End: 2025-06-04
Attending: COLON & RECTAL SURGERY
Payer: COMMERCIAL

## 2025-06-04 ENCOUNTER — ANESTHESIA EVENT (OUTPATIENT)
Dept: GASTROENTEROLOGY | Facility: HOSPITAL | Age: 55
End: 2025-06-04
Payer: COMMERCIAL

## 2025-06-04 VITALS
BODY MASS INDEX: 31.07 KG/M2 | RESPIRATION RATE: 20 BRPM | SYSTOLIC BLOOD PRESSURE: 113 MMHG | DIASTOLIC BLOOD PRESSURE: 73 MMHG | OXYGEN SATURATION: 100 % | TEMPERATURE: 96.5 F | HEART RATE: 74 BPM | HEIGHT: 68 IN | WEIGHT: 205 LBS

## 2025-06-04 DIAGNOSIS — Z12.11 ENCOUNTER FOR SCREENING COLONOSCOPY: ICD-10-CM

## 2025-06-04 PROCEDURE — 88305 TISSUE EXAM BY PATHOLOGIST: CPT | Performed by: PATHOLOGY

## 2025-06-04 PROCEDURE — 45380 COLONOSCOPY AND BIOPSY: CPT | Performed by: COLON & RECTAL SURGERY

## 2025-06-04 RX ORDER — PROPOFOL 10 MG/ML
INJECTION, EMULSION INTRAVENOUS AS NEEDED
Status: DISCONTINUED | OUTPATIENT
Start: 2025-06-04 | End: 2025-06-04

## 2025-06-04 RX ORDER — SODIUM CHLORIDE, SODIUM LACTATE, POTASSIUM CHLORIDE, CALCIUM CHLORIDE 600; 310; 30; 20 MG/100ML; MG/100ML; MG/100ML; MG/100ML
INJECTION, SOLUTION INTRAVENOUS CONTINUOUS PRN
Status: DISCONTINUED | OUTPATIENT
Start: 2025-06-04 | End: 2025-06-04

## 2025-06-04 RX ORDER — LIDOCAINE HYDROCHLORIDE 20 MG/ML
INJECTION, SOLUTION EPIDURAL; INFILTRATION; INTRACAUDAL; PERINEURAL AS NEEDED
Status: DISCONTINUED | OUTPATIENT
Start: 2025-06-04 | End: 2025-06-04

## 2025-06-04 RX ADMIN — PROPOFOL 20 MG: 10 INJECTION, EMULSION INTRAVENOUS at 08:13

## 2025-06-04 RX ADMIN — LIDOCAINE HYDROCHLORIDE 5 ML: 20 INJECTION, SOLUTION EPIDURAL; INFILTRATION; INTRACAUDAL; PERINEURAL at 08:12

## 2025-06-04 RX ADMIN — PROPOFOL 30 MG: 10 INJECTION, EMULSION INTRAVENOUS at 08:24

## 2025-06-04 RX ADMIN — PROPOFOL 40 MG: 10 INJECTION, EMULSION INTRAVENOUS at 08:20

## 2025-06-04 RX ADMIN — PROPOFOL 40 MG: 10 INJECTION, EMULSION INTRAVENOUS at 08:16

## 2025-06-04 RX ADMIN — SODIUM CHLORIDE, SODIUM LACTATE, POTASSIUM CHLORIDE, AND CALCIUM CHLORIDE: .6; .31; .03; .02 INJECTION, SOLUTION INTRAVENOUS at 07:56

## 2025-06-04 RX ADMIN — PROPOFOL 100 MG: 10 INJECTION, EMULSION INTRAVENOUS at 08:12

## 2025-06-04 NOTE — ANESTHESIA PREPROCEDURE EVALUATION
Procedure:  COLONOSCOPY    Phentermine last dose Friday  NPO approp    Relevant Problems   CARDIO   (+) Essential hypertension   (+) Hyperlipidemia      PULMONARY   (+) FATOUMATA (obstructive sleep apnea)        Physical Exam    Airway     Mallampati score: II  TM Distance: >3 FB        Cardiovascular  Cardiovascular exam normal    Dental   No notable dental hx     Pulmonary  Pulmonary exam normal     Neurological      Other Findings        Anesthesia Plan  ASA Score- 2     Anesthesia Type- IV sedation with anesthesia with ASA Monitors.         Additional Monitors:     Airway Plan:            Plan Factors-Exercise tolerance (METS): >4 METS.    Chart reviewed.   Existing labs reviewed. Patient summary reviewed.                  Induction-     Postoperative Plan- .   Monitoring Plan - Monitoring plan - standard ASA monitoring          Informed Consent- Anesthetic plan and risks discussed with patient.  I personally reviewed this patient with the CRNA. Discussed and agreed on the Anesthesia Plan with the CRNA..      NPO Status:  Vitals Value Taken Time   Date of last liquid 06/04/25 06/04/25 07:09   Time of last liquid 0200 06/04/25 07:09   Date of last solid 06/02/25 06/04/25 07:09   Time of last solid 2300 06/04/25 07:09

## 2025-06-04 NOTE — H&P
"H&P - Colorectal   Name: Wilder Pacheco 54 y.o. male I MRN: [id number]  Unit/Bed#:  I Date of Admission: 6/4/2025   Date of Service: 6/4/2025 I Hospital Day: 0     Assessment & Plan  Encounter for screening colonoscopy  Screening colonoscopy,discussed in a face-to-face, personal, informed consent process, the benefits, alternatives, risks including not limited to bleeding, missed lesion, perforation requiring emergent surgery discussed/understood.      History of Present Illness   Wilder Pacheco is a 54 y.o. male who presents for colonoscopy.  Review of Systems  Historical Information   Past Medical History[1]  Past Surgical History[2]  Social History[3]  E-Cigarette/Vaping    E-Cigarette Use Never User      E-Cigarette/Vaping Substances    Nicotine No     THC No     CBD No     Flavoring No     Other No     Unknown No      Family History[4]    Objective :  Temp:  [96 °F (35.6 °C)] 96 °F (35.6 °C)  HR:  [68] 68  BP: (134)/(81) 134/81  Resp:  [18] 18  SpO2:  [100 %] 100 %  O2 Device: None (Room air)      Physical Exam    Cardiovascular:      Rate and Rhythm: Normal rate.   Pulmonary:      Effort: Pulmonary effort is normal.   Abdominal:      Palpations: Abdomen is soft.      Tenderness: There is no abdominal tenderness.     Musculoskeletal:      Right lower leg: No edema.      Left lower leg: No edema.     Neurological:      Mental Status: He is alert.         Lab Results: I have reviewed the following results:  No results for input(s): \"WBC\", \"HGB\", \"HCT\", \"PLT\", \"BANDSPCT\", \"SODIUM\", \"K\", \"CL\", \"CO2\", \"BUN\", \"CREATININE\", \"GLUC\", \"CAIONIZED\", \"MG\", \"PHOS\", \"AST\", \"ALT\", \"ALB\", \"TBILI\", \"DBILI\", \"ALKPHOS\", \"PTT\", \"INR\", \"HSTNI0\", \"HSTNI2\", \"BNP\", \"LACTICACID\" in the last 72 hours.    Imaging Results Review: No pertinent imaging studies reviewed.  Other Study Results Review: No additional pertinent studies reviewed.               [1]   Past Medical History:  Diagnosis Date    Allergic     Hyperlipidemia     " Hypertension     Obesity     Patient denies medical problems     Pneumonia    [2]   Past Surgical History:  Procedure Laterality Date    ACHILLES TENDON REPAIR      FINGER SURGERY      right hand figth digit    FRACTURE SURGERY      HEEL SPUR SURGERY      KNEE SURGERY      VASECTOMY  01/08/2024   [3]   Social History  Tobacco Use    Smoking status: Never    Smokeless tobacco: Never   Vaping Use    Vaping status: Never Used   Substance and Sexual Activity    Alcohol use: Yes     Alcohol/week: 9.0 standard drinks of alcohol     Types: 3 Shots of liquor, 6 Standard drinks or equivalent per week     Comment: soc    Drug use: Never    Sexual activity: Yes     Partners: Female     Birth control/protection: Condom Male, Rhythm   [4]   Family History  Problem Relation Name Age of Onset    Fibromyalgia Mother Radha Turjayjay     Aneurysm Mother Radhaaga Pacheco     Heart disease Mother Radha Pacheco     Hypertension Father Al Turjayjay     Hyperlipidemia Father Al Turjayjay     Anxiety disorder Sister Joi Pacheco     Completed Suicide  Sister Joi Pacheco

## 2025-06-04 NOTE — ANESTHESIA POSTPROCEDURE EVALUATION
Post-Op Assessment Note    CV Status:  Stable  Pain Score: 0    Pain management: adequate       Mental Status:  Awake and alert   Hydration Status:  Stable and euvolemic   PONV Controlled:  None   Airway Patency:  Patent     Post Op Vitals Reviewed: Yes    No anethesia notable event occurred.    Staff: CRNA           Last Filed PACU Vitals:  Vitals Value Taken Time   Temp     Pulse 77      /63    Resp 16    SpO2 100

## 2025-06-09 PROCEDURE — 88305 TISSUE EXAM BY PATHOLOGIST: CPT | Performed by: PATHOLOGY

## 2025-06-10 ENCOUNTER — RESULTS FOLLOW-UP (OUTPATIENT)
Age: 55
End: 2025-06-10

## 2025-06-12 ENCOUNTER — APPOINTMENT (OUTPATIENT)
Dept: LAB | Facility: CLINIC | Age: 55
End: 2025-06-12
Attending: NURSE PRACTITIONER
Payer: COMMERCIAL

## 2025-06-12 DIAGNOSIS — E78.2 MIXED HYPERLIPIDEMIA: ICD-10-CM

## 2025-06-12 DIAGNOSIS — N52.8 OTHER MALE ERECTILE DYSFUNCTION: ICD-10-CM

## 2025-06-12 DIAGNOSIS — E55.9 VITAMIN D DEFICIENCY: ICD-10-CM

## 2025-06-12 DIAGNOSIS — R63.4 WEIGHT LOSS: ICD-10-CM

## 2025-06-12 DIAGNOSIS — E66.812 CLASS 2 OBESITY DUE TO EXCESS CALORIES WITHOUT SERIOUS COMORBIDITY WITH BODY MASS INDEX (BMI) OF 36.0 TO 36.9 IN ADULT: ICD-10-CM

## 2025-06-12 DIAGNOSIS — E66.09 CLASS 2 OBESITY DUE TO EXCESS CALORIES WITHOUT SERIOUS COMORBIDITY WITH BODY MASS INDEX (BMI) OF 36.0 TO 36.9 IN ADULT: ICD-10-CM

## 2025-06-12 LAB
25(OH)D3 SERPL-MCNC: 23.8 NG/ML (ref 30–100)
ALBUMIN SERPL BCG-MCNC: 4.5 G/DL (ref 3.5–5)
ALP SERPL-CCNC: 88 U/L (ref 34–104)
ALT SERPL W P-5'-P-CCNC: 15 U/L (ref 7–52)
ANION GAP SERPL CALCULATED.3IONS-SCNC: 5 MMOL/L (ref 4–13)
AST SERPL W P-5'-P-CCNC: 12 U/L (ref 13–39)
BASOPHILS # BLD AUTO: 0.02 THOUSANDS/ÂΜL (ref 0–0.1)
BASOPHILS NFR BLD AUTO: 0 % (ref 0–1)
BILIRUB SERPL-MCNC: 0.83 MG/DL (ref 0.2–1)
BUN SERPL-MCNC: 22 MG/DL (ref 5–25)
CALCIUM SERPL-MCNC: 9.2 MG/DL (ref 8.4–10.2)
CHLORIDE SERPL-SCNC: 110 MMOL/L (ref 96–108)
CHOLEST SERPL-MCNC: 132 MG/DL (ref ?–200)
CO2 SERPL-SCNC: 26 MMOL/L (ref 21–32)
CREAT SERPL-MCNC: 0.85 MG/DL (ref 0.6–1.3)
EOSINOPHIL # BLD AUTO: 0.09 THOUSAND/ÂΜL (ref 0–0.61)
EOSINOPHIL NFR BLD AUTO: 2 % (ref 0–6)
ERYTHROCYTE [DISTWIDTH] IN BLOOD BY AUTOMATED COUNT: 12.5 % (ref 11.6–15.1)
ESTRADIOL SERPL-MCNC: 20.6 PG/ML (ref 0–33.1)
GFR SERPL CREATININE-BSD FRML MDRD: 98 ML/MIN/1.73SQ M
GLUCOSE P FAST SERPL-MCNC: 95 MG/DL (ref 65–99)
HCT VFR BLD AUTO: 43.1 % (ref 36.5–49.3)
HDLC SERPL-MCNC: 59 MG/DL
HGB BLD-MCNC: 14.4 G/DL (ref 12–17)
IMM GRANULOCYTES # BLD AUTO: 0.02 THOUSAND/UL (ref 0–0.2)
IMM GRANULOCYTES NFR BLD AUTO: 0 % (ref 0–2)
LDLC SERPL CALC-MCNC: 64 MG/DL (ref 0–100)
LYMPHOCYTES # BLD AUTO: 1.25 THOUSANDS/ÂΜL (ref 0.6–4.47)
LYMPHOCYTES NFR BLD AUTO: 27 % (ref 14–44)
MCH RBC QN AUTO: 30.8 PG (ref 26.8–34.3)
MCHC RBC AUTO-ENTMCNC: 33.4 G/DL (ref 31.4–37.4)
MCV RBC AUTO: 92 FL (ref 82–98)
MONOCYTES # BLD AUTO: 0.45 THOUSAND/ÂΜL (ref 0.17–1.22)
MONOCYTES NFR BLD AUTO: 10 % (ref 4–12)
NEUTROPHILS # BLD AUTO: 2.73 THOUSANDS/ÂΜL (ref 1.85–7.62)
NEUTS SEG NFR BLD AUTO: 61 % (ref 43–75)
NRBC BLD AUTO-RTO: 0 /100 WBCS
PLATELET # BLD AUTO: 311 THOUSANDS/UL (ref 149–390)
PMV BLD AUTO: 8.7 FL (ref 8.9–12.7)
POTASSIUM SERPL-SCNC: 4.5 MMOL/L (ref 3.5–5.3)
PROT SERPL-MCNC: 6.7 G/DL (ref 6.4–8.4)
RBC # BLD AUTO: 4.67 MILLION/UL (ref 3.88–5.62)
SODIUM SERPL-SCNC: 141 MMOL/L (ref 135–147)
T4 FREE SERPL-MCNC: 0.76 NG/DL (ref 0.61–1.12)
TRIGL SERPL-MCNC: 47 MG/DL (ref ?–150)
TSH SERPL DL<=0.05 MIU/L-ACNC: 1.22 UIU/ML (ref 0.45–4.5)
WBC # BLD AUTO: 4.56 THOUSAND/UL (ref 4.31–10.16)

## 2025-06-12 PROCEDURE — 80053 COMPREHEN METABOLIC PANEL: CPT

## 2025-06-12 PROCEDURE — 84443 ASSAY THYROID STIM HORMONE: CPT

## 2025-06-12 PROCEDURE — 82306 VITAMIN D 25 HYDROXY: CPT

## 2025-06-12 PROCEDURE — 85025 COMPLETE CBC W/AUTO DIFF WBC: CPT

## 2025-06-12 PROCEDURE — 36415 COLL VENOUS BLD VENIPUNCTURE: CPT

## 2025-06-12 PROCEDURE — 84439 ASSAY OF FREE THYROXINE: CPT

## 2025-06-12 PROCEDURE — 82670 ASSAY OF TOTAL ESTRADIOL: CPT

## 2025-06-12 PROCEDURE — 80061 LIPID PANEL: CPT

## 2025-06-17 ENCOUNTER — OFFICE VISIT (OUTPATIENT)
Dept: INTERNAL MEDICINE CLINIC | Facility: CLINIC | Age: 55
End: 2025-06-17
Payer: COMMERCIAL

## 2025-06-17 VITALS
OXYGEN SATURATION: 97 % | HEIGHT: 68 IN | SYSTOLIC BLOOD PRESSURE: 120 MMHG | HEART RATE: 98 BPM | TEMPERATURE: 96.4 F | BODY MASS INDEX: 31.31 KG/M2 | DIASTOLIC BLOOD PRESSURE: 78 MMHG | WEIGHT: 206.6 LBS

## 2025-06-17 DIAGNOSIS — G47.33 OSA (OBSTRUCTIVE SLEEP APNEA): ICD-10-CM

## 2025-06-17 DIAGNOSIS — E66.09 CLASS 2 OBESITY DUE TO EXCESS CALORIES WITHOUT SERIOUS COMORBIDITY WITH BODY MASS INDEX (BMI) OF 36.0 TO 36.9 IN ADULT: ICD-10-CM

## 2025-06-17 DIAGNOSIS — E78.2 MIXED HYPERLIPIDEMIA: ICD-10-CM

## 2025-06-17 DIAGNOSIS — E66.812 CLASS 2 OBESITY DUE TO EXCESS CALORIES WITHOUT SERIOUS COMORBIDITY WITH BODY MASS INDEX (BMI) OF 36.0 TO 36.9 IN ADULT: ICD-10-CM

## 2025-06-17 DIAGNOSIS — I10 ESSENTIAL HYPERTENSION: Primary | ICD-10-CM

## 2025-06-17 PROCEDURE — 99214 OFFICE O/P EST MOD 30 MIN: CPT | Performed by: NURSE PRACTITIONER

## 2025-06-17 NOTE — PROGRESS NOTES
Name: Wilder Pacheco      : 1970      MRN: [id number]  Encounter Provider: LIAM Prince  Encounter Date: 2025   Encounter department: [facility]  :  Assessment & Plan  Essential hypertension  Well controlled.  Continue amlodipine.        FATOUMATA (obstructive sleep apnea)  Not wearing CPAP due to weight loss.        Mixed hyperlipidemia  On a statin.   Orders:  •  Comprehensive metabolic panel; Future  •  Lipid Panel with Direct LDL reflex; Future    Class 2 obesity due to excess calories without serious comorbidity with body mass index (BMI) of 36.0 to 36.9 in adult  Down 40 lbs.  On phentermine and topamax.   Continue healthy lifestyle changes.                 History of Present Illness   Patient presents for 6 month follow up   Reviewed lab work in office today    Patient denies any concerns at this time    HTN- stable on amlodipine. Denies HA, SOB, chest pain   Reports he does not monitor pressures at home     HLD- stable on Lipitor. Denies any side effects of medications    Weight loss- on Topamax and phentermine. Reports they are working well. Reports he has lost about 40 lb.   Reports he does not follow any specific diet or exercise regimen  Reports he does walk and has been eating less and has been mindful of portion control.    FATOUMATA- reports he has not had to use his CPAP recently. States he has felt great. Girlfriend does not hear snoring anymore.         Review of Systems   Constitutional:  Negative for activity change, appetite change, fatigue and unexpected weight change.   Eyes:  Negative for visual disturbance.   Respiratory:  Negative for cough and shortness of breath.    Cardiovascular:  Negative for chest pain, palpitations and leg swelling.   Gastrointestinal:  Negative for abdominal pain, constipation and diarrhea.   Genitourinary:  Negative for difficulty urinating.   Musculoskeletal:  Negative for arthralgias.   Neurological:  Negative for  "dizziness, light-headedness and headaches.   Psychiatric/Behavioral:  Negative for sleep disturbance.        Objective   /78   Pulse 98   Temp (!) 96.4 °F (35.8 °C)   Ht 5' 8\" (1.727 m)   Wt 93.7 kg (206 lb 9.6 oz)   SpO2 97%   BMI 31.41 kg/m²      Physical Exam  Vitals reviewed.   Constitutional:       Appearance: Normal appearance. He is well-developed.   HENT:      Head: Normocephalic and atraumatic.     Eyes:      Conjunctiva/sclera: Conjunctivae normal.       Cardiovascular:      Rate and Rhythm: Normal rate and regular rhythm.      Heart sounds: Normal heart sounds.   Pulmonary:      Effort: Pulmonary effort is normal.      Breath sounds: Normal breath sounds.   Abdominal:      General: Bowel sounds are normal.     Musculoskeletal:         General: Normal range of motion.      Right lower leg: No edema.      Left lower leg: No edema.     Skin:     General: Skin is warm and dry.     Neurological:      Mental Status: He is alert and oriented to person, place, and time.     Psychiatric:         Mood and Affect: Mood normal.         Behavior: Behavior normal.       "

## 2025-06-17 NOTE — ASSESSMENT & PLAN NOTE
On a statin.   Orders:    Comprehensive metabolic panel; Future    Lipid Panel with Direct LDL reflex; Future

## 2025-08-17 DIAGNOSIS — E78.2 MIXED HYPERLIPIDEMIA: ICD-10-CM

## 2025-08-17 DIAGNOSIS — I10 ESSENTIAL HYPERTENSION: ICD-10-CM

## 2025-08-18 RX ORDER — AMLODIPINE BESYLATE 5 MG/1
5 TABLET ORAL DAILY
Qty: 90 TABLET | Refills: 1 | Status: SHIPPED | OUTPATIENT
Start: 2025-08-18

## 2025-08-18 RX ORDER — ATORVASTATIN CALCIUM 20 MG/1
20 TABLET, FILM COATED ORAL DAILY
Qty: 90 TABLET | Refills: 1 | Status: SHIPPED | OUTPATIENT
Start: 2025-08-18